# Patient Record
Sex: FEMALE | Race: WHITE | NOT HISPANIC OR LATINO | Employment: FULL TIME | URBAN - METROPOLITAN AREA
[De-identification: names, ages, dates, MRNs, and addresses within clinical notes are randomized per-mention and may not be internally consistent; named-entity substitution may affect disease eponyms.]

---

## 2024-01-19 ENCOUNTER — HOSPITAL ENCOUNTER (OUTPATIENT)
Dept: ULTRASOUND IMAGING | Facility: HOSPITAL | Age: 34
Discharge: HOME | End: 2024-01-19
Payer: COMMERCIAL

## 2024-01-19 DIAGNOSIS — N97.9 FEMALE INFERTILITY, UNSPECIFIED: ICD-10-CM

## 2024-01-19 PROCEDURE — 76830 TRANSVAGINAL US NON-OB: CPT

## 2024-01-19 PROCEDURE — 76856 US EXAM PELVIC COMPLETE: CPT

## 2024-04-09 ENCOUNTER — OFFICE VISIT (OUTPATIENT)
Dept: OBGYN CLINIC | Facility: CLINIC | Age: 34
End: 2024-04-09
Payer: COMMERCIAL

## 2024-04-09 VITALS
SYSTOLIC BLOOD PRESSURE: 98 MMHG | WEIGHT: 144.8 LBS | DIASTOLIC BLOOD PRESSURE: 62 MMHG | BODY MASS INDEX: 23.27 KG/M2 | HEIGHT: 66 IN

## 2024-04-09 DIAGNOSIS — N96 HISTORY OF MULTIPLE MISCARRIAGES: Primary | ICD-10-CM

## 2024-04-09 PROCEDURE — 99203 OFFICE O/P NEW LOW 30 MIN: CPT | Performed by: OBSTETRICS & GYNECOLOGY

## 2024-04-09 RX ORDER — LORAZEPAM 0.5 MG/1
TABLET ORAL
COMMUNITY

## 2024-04-09 RX ORDER — POLYETHYLENE GLYCOL 3350 17 G/17G
POWDER, FOR SOLUTION ORAL
COMMUNITY

## 2024-04-09 NOTE — PROGRESS NOTES
Assessment/Plan:   Diagnoses and all orders for this visit:    History of multiple miscarriages  - working with [facility]. Discussed anticipated discharge from [facility] provider to us after approximately 8-10 weeks gestation.   - discussed possible antiphospholipid antibody syndrome and what implications that may mean for pregnancy. We discussed if confirmed, anticoagulation is recommended, usually with lovenox injections during the pregnancy and to continue 6 weeks postpartum.   - semen analysis was performed recently for her . She is hoping that the records will be sent to the [place] office promptly.     Follow up for prenatal care or for annual gyn exam when due.     Other orders  -     LORazepam (ATIVAN) 0.5 mg tablet; as needed  -     polyethylene glycol (MiraLax Mix-In [place]) 17 g packet  -     Prenatal MV-Min-Fe Fum-FA-DHA (PRENATAL 1 PO); Take by mouth    I have spent a total time of 35 minutes on 24 in caring for this patient including Diagnostic results, Instructions for management, Patient and family education, Impressions, Counseling / Coordination of care, Reviewing / ordering tests, medicine, procedures  , Obtaining or reviewing history  , and Communicating with other healthcare professionals .            Subjective:    Patient ID: Camilla Cuevas is a 33 y.o. female.  Chief Complaint   Patient presents with    Consult     Pt is here for a fertility consult. Pt is seeing a specialist in [place] for fertility. Pt has had a recent miscarriage and would like to establish care with office.        Camilla is a 32yo  presenting to the office to establish care and discuss her obstetric history. Camilla has a history of 2 prior miscarriages occurring in  and . She recently had an initial consultation with an [facility] provider in [place] for evaluation of recurrent pregnancy loss and infertility. She had an HSG performed in 2024 that showed bilateral tubal patency. Her most recent blood work  "was notable for possible antiphospholipid antibody syndrome. She is due for repeat labs to be done in 6 weeks to confirm diagnosis.     As she is establishing care with an [facility], she is looking to establish care with a gyn in preparation for a prenatal care and for gyn care to follow.   Her most recent pap smear was in 2022 and showed normal cytology and neg HPV.        The following portions of the patient's history were reviewed and updated as appropriate: allergies, current medications, past family history, past medical history, past social history, past surgical history, and problem list.    Review of Systems   Constitutional:  Negative for activity change, appetite change and unexpected weight change.   Respiratory:  Negative for shortness of breath.    Cardiovascular:  Negative for chest pain.   Gastrointestinal:  Negative for constipation, diarrhea, nausea and vomiting.   Genitourinary:  Negative for dyspareunia, menstrual problem, pelvic pain, vaginal bleeding, vaginal discharge and vaginal pain.   Neurological:  Negative for weakness and headaches.         Objective:  BP 98/62 (BP Location: Left arm, Patient Position: Sitting, Cuff Size: Standard)   Ht 5' 6\" (1.676 m)   Wt 65.7 kg (144 lb 12.8 oz)   LMP 02/08/2024 (Exact Date)   BMI 23.37 kg/m²   Physical Exam  Constitutional:       General: She is not in acute distress.     Appearance: Normal appearance. She is normal weight. She is not diaphoretic.   HENT:      Head: Normocephalic and atraumatic.   Pulmonary:      Effort: Pulmonary effort is normal. No respiratory distress.   Musculoskeletal:      Right lower leg: No edema.      Left lower leg: No edema.   Neurological:      Mental Status: She is alert and oriented to person, place, and time.   Skin:     General: Skin is warm and dry.      Coloration: Skin is not pale.   Psychiatric:         Mood and Affect: Mood normal.         Behavior: Behavior normal.         Thought Content: Thought content normal. "         Judgment: Judgment normal.   Vitals and nursing note reviewed.

## 2024-08-23 ENCOUNTER — ANNUAL EXAM (OUTPATIENT)
Dept: OBGYN CLINIC | Facility: CLINIC | Age: 34
End: 2024-08-23
Payer: COMMERCIAL

## 2024-08-23 VITALS
DIASTOLIC BLOOD PRESSURE: 66 MMHG | WEIGHT: 138 LBS | BODY MASS INDEX: 22.18 KG/M2 | SYSTOLIC BLOOD PRESSURE: 100 MMHG | HEIGHT: 66 IN

## 2024-08-23 DIAGNOSIS — Z01.419 WELL WOMAN EXAM WITH ROUTINE GYNECOLOGICAL EXAM: Primary | ICD-10-CM

## 2024-08-23 DIAGNOSIS — Z11.51 SCREENING FOR HPV (HUMAN PAPILLOMAVIRUS): ICD-10-CM

## 2024-08-23 DIAGNOSIS — Z12.4 ENCOUNTER FOR SCREENING FOR MALIGNANT NEOPLASM OF CERVIX: ICD-10-CM

## 2024-08-23 PROCEDURE — 99395 PREV VISIT EST AGE 18-39: CPT | Performed by: PHYSICIAN ASSISTANT

## 2024-08-23 RX ORDER — ASPIRIN 81 MG/1
81 TABLET, CHEWABLE ORAL DAILY
COMMUNITY

## 2024-08-23 RX ORDER — LETROZOLE 2.5 MG/1
2.5 TABLET, FILM COATED ORAL DAILY
COMMUNITY
Start: 2024-08-13

## 2024-08-23 NOTE — PROGRESS NOTES
ASSESSMENT & PLAN:   Diagnoses and all orders for this visit:    Well woman exam with routine gynecological exam  -     IGP, Aptima HPV, Rfx 16/18,45    Encounter for screening for malignant neoplasm of cervix  -     IGP, Aptima HPV, Rfx 16/18,45    Screening for HPV (human papillomavirus)  -     IGP, Aptima HPV, Rfx 16/18,45    Other orders  -     aspirin 81 mg chewable tablet; Use 81 mg daily  -     letrozole (FEMARA) 2.5 mg tablet; Take 2.5 mg by mouth daily      Discussed frequency of appointments with [facility] if patient decides to pursue IVF. Currently travels 90min each direction to [facility]. Discussed [facility] new location in [place], much closer to patient home. May consider transitioning in future if traveling is too much.     The following were reviewed in today's visit: ASCCP guidelines, Gardisil vaccination, STD testing breast self exam, STD testing, exercise, and healthy diet.    Patient to return to office in yearly for annual exam.     All questions have been answered to her satisfaction.        CC:  Annual Gynecologic Examination  Chief Complaint   Patient presents with    Gynecologic Exam     Camilla Cuevas is here for her annual exam; pap ordered.         HPI: Camilla Cuevas is a 34 y.o.  who presents for annual gynecologic examination.  She has the following concerns:    PCOS - following with [facility] at [facility]. Considering IVF. Has f/u appt with [facility] next week.       Health Maintenance:    Exercise: frequently  Breast exams/breast awareness: yes    Past Medical History:   Diagnosis Date    Abnormal Pap smear of cervix     Migraine     Miscarriage     Ovarian cyst     Polycystic ovary syndrome     Possibly- waiting on confirmation from a specializt       Past Surgical History:   Procedure Laterality Date    WISDOM TOOTH EXTRACTION         Past OB/Gyn History:  Period Cycle (Days): 28  Period Duration (Days): 4  Period Pattern: Regular  Menstrual Flow: Moderate  Menstrual Control: Tampon, Panty  liner  Dysmenorrhea: (!) Moderate  Dysmenorrhea Symptoms: CrampingPatient's last menstrual period was 08/13/2024 (exact date).    Last Pap: within the last 5 years : no abnormalities  History of abnormal Pap smear: yes  HPV vaccine completed: yes    Patient is currently sexually active.   STD testing: no  Current contraception: none      Family History  Family History   Problem Relation Age of Onset    No Known Problems Mother     No Known Problems Father     No Known Problems Sister     No Known Problems Brother     Breast cancer Paternal Grandmother     Breast cancer additional onset Paternal Grandmother     Cancer Paternal Grandfather         Brain cancer       Family history of uterine or ovarian cancer: no  Family history of breast cancer: yes  Family history of colon cancer: no    Social History:  Social History     Socioeconomic History    Marital status: /Civil Union     Spouse name: Not on file    Number of children: Not on file    Years of education: Not on file    Highest education level: Not on file   Occupational History    Not on file   Tobacco Use    Smoking status: Former     Current packs/day: 0.50     Average packs/day: 0.5 packs/day for 5.0 years (2.5 ttl pk-yrs)     Types: Cigarettes    Smokeless tobacco: Never    Tobacco comments:     From 6612-2345 (occasionally)    Vaping Use    Vaping status: Never Used   Substance and Sexual Activity    Alcohol use: Yes     Alcohol/week: 2.0 standard drinks of alcohol     Types: 2 Glasses of wine per week     Comment: occ    Drug use: Never    Sexual activity: Yes     Partners: Male     Birth control/protection: None   Other Topics Concern    Not on file   Social History Narrative    Not on file     Social Determinants of Health     Financial Resource Strain: Not on file   Food Insecurity: Not on file   Transportation Needs: Not on file   Physical Activity: Not on file   Stress: Not on file   Social Connections: Not on file   Intimate Partner  "Violence: Not on file   Housing Stability: Not on file     Domestic violence screen: negative    Allergies:  No Known Allergies    Medications:    Current Outpatient Medications:     aspirin 81 mg chewable tablet, Use 81 mg daily, Disp: , Rfl:     letrozole (FEMARA) 2.5 mg tablet, Take 2.5 mg by mouth daily, Disp: , Rfl:     LORazepam (ATIVAN) 0.5 mg tablet, as needed, Disp: , Rfl:     polyethylene glycol (MiraLax Mix-In [place]) 17 g packet, , Disp: , Rfl:     Prenatal MV-Min-Fe Fum-FA-DHA (PRENATAL 1 PO), Take by mouth, Disp: , Rfl:     Review of Systems:  Review of Systems   Constitutional:  Negative for chills, fever and unexpected weight change.   Respiratory:  Negative for shortness of breath.    Cardiovascular:  Negative for chest pain.   Gastrointestinal:  Negative for abdominal pain, diarrhea, nausea and vomiting.   Skin:  Negative for rash.   Psychiatric/Behavioral:  Negative for dysphoric mood. The patient is not nervous/anxious.          Physical Exam:  /66 (BP Location: Right arm, Patient Position: Sitting, Cuff Size: Standard)   Ht 5' 6\" (1.676 m)   Wt 62.6 kg (138 lb)   LMP 08/13/2024 (Exact Date)   BMI 22.27 kg/m²    Physical Exam  Constitutional:       Appearance: Normal appearance.   Genitourinary:      Vulva and urethral meatus normal.      No lesions in the vagina.      Right Labia: No rash or lesions.     Left Labia: No lesions or rash.     No vaginal discharge, erythema or bleeding.        Right Adnexa: not tender and no mass present.     Left Adnexa: not tender and no mass present.     No cervical discharge or lesion.      Uterus is not tender.   Breasts:     Breasts are symmetrical.      Right: No mass or skin change.      Left: No mass or skin change.   HENT:      Head: Normocephalic and atraumatic.   Cardiovascular:      Rate and Rhythm: Normal rate and regular rhythm.      Heart sounds: Normal heart sounds. No murmur heard.     No friction rub. No gallop.   Pulmonary:      Effort: " Pulmonary effort is normal.      Breath sounds: Normal breath sounds. No wheezing, rhonchi or rales.   Abdominal:      General: Abdomen is flat. There is no distension.      Palpations: Abdomen is soft.      Tenderness: There is no abdominal tenderness.   Musculoskeletal:      Cervical back: Neck supple.   Lymphadenopathy:      Upper Body:      Right upper body: No axillary adenopathy.      Left upper body: No axillary adenopathy.   Neurological:      General: No focal deficit present.      Mental Status: She is alert.   Skin:     General: Skin is warm and dry.   Psychiatric:         Mood and Affect: Mood normal.         Behavior: Behavior normal.   Vitals reviewed.

## 2024-08-29 LAB
CYTOLOGIST CVX/VAG CYTO: NORMAL
DX ICD CODE: NORMAL
HPV GENOTYPE REFLEX: NORMAL
HPV I/H RISK 4 DNA CVX QL PROBE+SIG AMP: NEGATIVE
OTHER STN SPEC: NORMAL
PATH REPORT.FINAL DX SPEC: NORMAL
SL AMB NOTE:: NORMAL
SL AMB SPECIMEN ADEQUACY: NORMAL
SL AMB TEST METHODOLOGY: NORMAL

## 2024-12-18 ENCOUNTER — ULTRASOUND (OUTPATIENT)
Dept: OBGYN CLINIC | Facility: CLINIC | Age: 34
End: 2024-12-18
Payer: COMMERCIAL

## 2024-12-18 VITALS
DIASTOLIC BLOOD PRESSURE: 78 MMHG | HEIGHT: 66 IN | BODY MASS INDEX: 23.3 KG/M2 | WEIGHT: 145 LBS | SYSTOLIC BLOOD PRESSURE: 120 MMHG

## 2024-12-18 DIAGNOSIS — D68.61 ANTIPHOSPHOLIPID SYNDROME DURING PREGNANCY (HCC): ICD-10-CM

## 2024-12-18 DIAGNOSIS — O99.119 ANTIPHOSPHOLIPID SYNDROME DURING PREGNANCY (HCC): ICD-10-CM

## 2024-12-18 DIAGNOSIS — N91.2 AMENORRHEA: Primary | ICD-10-CM

## 2024-12-18 PROCEDURE — 99214 OFFICE O/P EST MOD 30 MIN: CPT | Performed by: OBSTETRICS & GYNECOLOGY

## 2024-12-18 PROCEDURE — 76817 TRANSVAGINAL US OBSTETRIC: CPT | Performed by: OBSTETRICS & GYNECOLOGY

## 2024-12-18 NOTE — PROGRESS NOTES
Assessment/Plan:  Diagnoses and all orders for this visit:    Amenorrhea  -     Ambulatory Referral to Maternal Fetal Medicine; Future  -     AMB US OB < 14 weeks single or first gestation level 1    Antiphospholipid syndrome during pregnancy (HCC)  -     Ambulatory Referral to Maternal Fetal Medicine; Future        - Viable IUP @ 9w 2d EGA  - ARISTEO 25  - Continue PNV  - Patient to call for concerns  - RTO ~ 10-12 weeks for OB intake  - call MFM for 13 week NT scan/genetic testing.           Subjective:       Patient ID: Camilla Cuevas 1990        Camilla Cuevas is a 34 y.o.  presenting to the office for pregnancy confirmation. Patient's last menstrual period was 10/14/2024. , placing her at 9w2d today with ARISTEO of 25. She is feeling well. Patient has APS and is currently on Lovenox ppx and ASA.      Nausea:no  Vomiting: no  Bleeding: occasional spotting  Cramping: no  Headaches: no  Fatigue: yes  Constipation: no  Blood type, if known: unknown       OB History    Para Term  AB Living   3    2    SAB IAB Ectopic Multiple Live Births   2          # Outcome Date GA Lbr Faraz/2nd Weight Sex Type Anes PTL Lv   3 Current            2 SAB 24 4w0d    SAB   ND   1 SAB  6w0d    SAB         Obstetric Comments   Menarche 14          The following portions of the patient's history were reviewed and updated as appropriate: allergies, current medications, past family history, past medical history, past social history, past surgical history, and problem list.    Allergies:  Patient has no known allergies.    Medications:    Current Outpatient Medications:     aspirin 81 mg chewable tablet, Use 81 mg daily, Disp: , Rfl:     LORazepam (ATIVAN) 0.5 mg tablet, as needed, Disp: , Rfl:     polyethylene glycol (MiraLax Mix-In [place]) 17 g packet, , Disp: , Rfl:     Prenatal MV-Min-Fe Fum-FA-DHA (PRENATAL 1 PO), Take by mouth, Disp: , Rfl:       Review of Systems:   Review of Systems      "  Objective:       Visit Vitals  /78 (BP Location: Right arm, Patient Position: Sitting, Cuff Size: Standard)   Ht 5' 6\" (1.676 m)   Wt 65.8 kg (145 lb)   LMP 10/14/2024   BMI 23.40 kg/m²   OB Status Pregnant   Smoking Status Former   BSA 1.74 m²        GEN: The patient was alert and oriented x3, pleasant well-appearing female in no acute distress.   CV: Regular rate  PULM: nonlabored respirations  MSK: Normal gait  : normal external female genitalia   Skin: warm, dry  Neuro: no focal deficits  Psych: normal affect and judgement, cooperative    Ultrasound:     Viability US     Gestational sac: present               Location: intrauterine  Yolk sac: present  Fetal pole: present               CRL: 2.28 cm = 9w0d  Cardiac activity: present               Rate: 167 bpm     Ovaries: normal appearing bilaterally  Cul de sac: absence of free fluid  Uterus: normal in appearance           Ultrasound Probe Disinfection    A transvaginal ultrasound was performed.   Prior to use, disinfection was performed with High Level Disinfection Process (Trophon)  Probe serial number [id number]: [id number] was used    I have spent a total time of 30 minutes in caring for this patient on the day of the visit/encounter including Instructions for management, Patient and family education, Counseling / Coordination of care, Documenting in the medical record, Reviewing / ordering tests, medicine, procedures  , and Obtaining or reviewing history  .      Beulah Harkins MD  12/18/24  10:42 AM    "

## 2024-12-19 ENCOUNTER — TELEPHONE (OUTPATIENT)
Age: 34
End: 2024-12-19

## 2024-12-26 NOTE — PATIENT INSTRUCTIONS
Congratulations!! Please review our Pregnancy Essential Guide and [facility] L&D Virtual tour from our networks website.     [facility] Pregnancy Essentials Guide  [facility] ([email or web address])     [facility] ([email or web address])       .

## 2024-12-27 ENCOUNTER — INITIAL PRENATAL (OUTPATIENT)
Dept: OBGYN CLINIC | Facility: CLINIC | Age: 34
End: 2024-12-27

## 2024-12-27 VITALS
BODY MASS INDEX: 23.14 KG/M2 | DIASTOLIC BLOOD PRESSURE: 60 MMHG | HEIGHT: 66 IN | SYSTOLIC BLOOD PRESSURE: 108 MMHG | WEIGHT: 144 LBS

## 2024-12-27 DIAGNOSIS — O99.119 ANTIPHOSPHOLIPID SYNDROME DURING PREGNANCY (HCC): ICD-10-CM

## 2024-12-27 DIAGNOSIS — Z31.430 ENCOUNTER OF FEMALE FOR TESTING FOR GENETIC DISEASE CARRIER STATUS FOR PROCREATIVE MANAGEMENT: ICD-10-CM

## 2024-12-27 DIAGNOSIS — Z34.81 PRENATAL CARE, SUBSEQUENT PREGNANCY, FIRST TRIMESTER: Primary | ICD-10-CM

## 2024-12-27 DIAGNOSIS — Z36.9 ENCOUNTER FOR ANTENATAL SCREENING: ICD-10-CM

## 2024-12-27 DIAGNOSIS — E28.2 PCOS (POLYCYSTIC OVARIAN SYNDROME): ICD-10-CM

## 2024-12-27 DIAGNOSIS — D68.61 ANTIPHOSPHOLIPID SYNDROME DURING PREGNANCY (HCC): ICD-10-CM

## 2024-12-27 PROCEDURE — OBC

## 2024-12-27 RX ORDER — ENOXAPARIN SODIUM 100 MG/ML
40 INJECTION SUBCUTANEOUS DAILY
COMMUNITY
Start: 2024-11-08 | End: 2024-12-27 | Stop reason: SDUPTHER

## 2024-12-27 RX ORDER — ENOXAPARIN SODIUM 100 MG/ML
40 INJECTION SUBCUTANEOUS DAILY
Qty: 36 ML | Refills: 3 | Status: SHIPPED | OUTPATIENT
Start: 2024-12-27 | End: 2025-03-27

## 2024-12-27 NOTE — PROGRESS NOTES
OB INTAKE INTERVIEW  Patient is 34 y.o. who presents for OB intake at 10 4/7wks  She is accompanied by herself during this encounter  The father of her baby Darwin Cuevas is involved in the pregnancy and is 43 years old.      Last Menstrual Period: 10/14/24  Ultrasound: Measured 9 weeks 0 days on 24  Estimated Date of Delivery: 25 confirmed by 9 week US    Signs/Symptoms of Pregnancy  Current pregnancy symptoms: Nausea, fatigue  Constipation YES  Headaches no  Cramping/spotting no  PICA cravings no    Diabetes-  Body mass index is 23.24 kg/m².  If patient has 1 or more, please order early 1 hour GTT  History of GDM no  BMI >35 no  History of PCOS or current metformin use YES-PCOS  History of LGA/macrosomic infant (4000g/9lbs) no    If patient has 2 or more, please order early 1 hour GTT  BMI>30 no  AMA no  First degree relative with type 2 diabetes no  History of chronic HTN, hyperlipidemia, elevated A1C no  High risk race (, , ,  or ) no    Hypertension- if you answer yes to any of the following, please order baseline preeclampsia labs (cbc, comprehensive metabolic panel, urine protein creatinine ratio, uric acid)  History of of chronic HTN no  History of gestational HTN no  History of preeclampsia, eclampsia, or HELLP syndrome no  History of diabetes no  History of lupus,sjogrens syndrome, kidney disease no    Thyroid- if yes order TSH with reflex T4  History of thyroid disease no    Bleeding Disorder or Hx of DVT-patient or first degree relative with history of. Order the following if not done previously.   (Factor V, antithrombin III, prothrombin gene mutation, protein C and S Ag, lupus anticoagulant, anticardiolipin, beta-2 glycoprotein)   YES-Patient has Antiphospholipid Syndrome-on lovenox.    OB/GYN-  History of abnormal pap smear YES       Date of last pap smear 10/14/24  History of HPV YES  History of Herpes/HSV no  History of  other STI (gonorrhea, chlamydia, trich) no  History of prior  no  History of prior  no  History of  delivery prior to 36 weeks 6 days no  History of Varicella or Vaccination had varicella  History of blood transfusion no  Ok for blood transfusion YES    Substance screening-   History of tobacco use YES-stopped 10 years ago  Currently using tobacco no  Substance Use Screen Level (N/A, LOW, HIGH) N/A    MRSA Screening-   Does the pt have a hx of MRSA? no    Immunizations:  Influenza vaccine given this season Not interested  Discussed Tdap vaccine YES  Discussed COVID Vaccine YES    Genetic/[facility]-  Do you or your partner have a history of any of the following in yourselves or first degree relatives?  Cystic fibrosis no  Spinal muscular atrophy no  Hemoglobinopathy/Sickle Cell/Thalassemia no  Fragile X Intellectual Disability no      If no, discuss Carrier Screening being completed once in a lifetime as a standard of care lab test. Place orders for Cystic Fibrosis Gene Test ([id number]) and Spinal Muscular Atrophy DNA ([id number]) Patient had screenings done in  and was not a carrier. Done in [facility] [place].      Appointment for Nuchal Translucency Ultrasound at [facility] scheduled for 25      Interview education  [facility] Pregnancy Essentials Book reviewed, discussed and attached to their AVS yes    Nurse/Family Partnership- patient may qualify No; referral placed No    Prenatal lab work scripts YES  Extra labs ordered:  1 hr GTT    Aspirin/Preeclampsia Screen    Risk Level Risk Factor Recommendation   LOW Prior Uncomplicated full-term delivery no No Aspirin recommendation        MODERATE Nulliparity YES Recommend low-dose aspirin if     BMI>30 no 2 or more moderate risk factors    Family History Preeclampsia (mother/sister) no     35yr old or greater no     Black Race, Concern for SDOH/Low Socioeconomic no     IVF Pregnancy  no     Personal History Risks (low birth weight, prior adverse preg  outcome, >10yr preg interval) no         HIGH History of Preeclampsia no Recommend low-dose aspirin if     Multifetal gestation no 1 or more high risk factors    Chronic HTN no     Type 1 or 2 Diabetes no     Renal Disease no     Autoimmune Disease  no      Contraindications to ASA therapy:  NSAID/ ASA allergy: no  Nasal polyps: no  Asthma with history of ASA induced bronchospasm: no  Relative contraindications:  History of GI bleed: no  Active peptic ulcer disease: no  Severe hepatic dysfunction: no    Patient should be recommended to take ASA 162mg during this pregnancy from 12-36wks to lower her risk of preeclampsia: Patient has already started ASA therapy per [facility].          The patient has a history now or in prior pregnancy notable for:  Antiphospholipid Syndrome- currently on Lovenox daily, PCOS-not on metformin, Migraines, 2 miscarriages, Anxiety-EPDS-2,       Details that I feel the provider should be aware of: This is a planned and welcomed pregnancy for parents. Patient was being seen at [facility] however now with spontaneous conception. Patient is experiencing some nausea. OTC medications and diet were discussed. 1hr GTT ordered for PCOS, Patient has Antiphospholipid Syndrome and is currently self administering Lovenox 40 mg daily. Pa- Kevin will refill Patient lovenox. Patient was on ativan for anxiety however stopped with confirmed pregnancy.  Patient knows to call OB for symptoms and how to contact her nurse navigator. Patient was made aware to have lab orders completed before next appointment. Patient denies any questions at this point and verbalizes understanding.         PN1 visit scheduled. The patient was oriented to our practice, the navigator role, reviewed delivering physicians and [facility] for Delivery. All questions were answered.    Interviewed by: Jolynn Castro RN

## 2024-12-31 DIAGNOSIS — D68.61 ANTIPHOSPHOLIPID SYNDROME DURING PREGNANCY (HCC): ICD-10-CM

## 2024-12-31 DIAGNOSIS — O99.119 ANTIPHOSPHOLIPID SYNDROME DURING PREGNANCY (HCC): ICD-10-CM

## 2024-12-31 RX ORDER — ENOXAPARIN SODIUM 100 MG/ML
40 INJECTION SUBCUTANEOUS DAILY
Qty: 36 ML | Refills: 3 | Status: SHIPPED | OUTPATIENT
Start: 2024-12-31 | End: 2025-03-31

## 2025-01-03 ENCOUNTER — APPOINTMENT (OUTPATIENT)
Dept: LAB | Facility: CLINIC | Age: 35
End: 2025-01-03
Payer: COMMERCIAL

## 2025-01-03 ENCOUNTER — RESULTS FOLLOW-UP (OUTPATIENT)
Dept: OBGYN CLINIC | Facility: CLINIC | Age: 35
End: 2025-01-03

## 2025-01-03 DIAGNOSIS — Z34.81 PRENATAL CARE, SUBSEQUENT PREGNANCY, FIRST TRIMESTER: ICD-10-CM

## 2025-01-03 DIAGNOSIS — E28.2 PCOS (POLYCYSTIC OVARIAN SYNDROME): ICD-10-CM

## 2025-01-03 LAB
ABO GROUP BLD: NORMAL
BACTERIA UR QL AUTO: NORMAL /HPF
BASOPHILS # BLD AUTO: 0.02 THOUSANDS/ΜL (ref 0–0.1)
BASOPHILS NFR BLD AUTO: 0 % (ref 0–1)
BILIRUB UR QL STRIP: NEGATIVE
BLD GP AB SCN SERPL QL: NEGATIVE
CLARITY UR: CLEAR
COLOR UR: NORMAL
EOSINOPHIL # BLD AUTO: 0.04 THOUSAND/ΜL (ref 0–0.61)
EOSINOPHIL NFR BLD AUTO: 1 % (ref 0–6)
ERYTHROCYTE [DISTWIDTH] IN BLOOD BY AUTOMATED COUNT: 12.4 % (ref 11.6–15.1)
GLUCOSE 1H P 50 G GLC PO SERPL-MCNC: 108 MG/DL (ref 70–134)
GLUCOSE UR STRIP-MCNC: NEGATIVE MG/DL
HBV SURFACE AG SER QL: NORMAL
HCT VFR BLD AUTO: 37.7 % (ref 34.8–46.1)
HCV AB SER QL: NORMAL
HGB BLD-MCNC: 13 G/DL (ref 11.5–15.4)
HGB UR QL STRIP.AUTO: NEGATIVE
HIV 1+2 AB+HIV1 P24 AG SERPL QL IA: NORMAL
HIV 2 AB SERPL QL IA: NORMAL
HIV1 AB SERPL QL IA: NORMAL
HIV1 P24 AG SERPL QL IA: NORMAL
IMM GRANULOCYTES # BLD AUTO: 0.06 THOUSAND/UL (ref 0–0.2)
IMM GRANULOCYTES NFR BLD AUTO: 1 % (ref 0–2)
KETONES UR STRIP-MCNC: NEGATIVE MG/DL
LEUKOCYTE ESTERASE UR QL STRIP: NEGATIVE
LYMPHOCYTES # BLD AUTO: 1.71 THOUSANDS/ΜL (ref 0.6–4.47)
LYMPHOCYTES NFR BLD AUTO: 23 % (ref 14–44)
MCH RBC QN AUTO: 31.3 PG (ref 26.8–34.3)
MCHC RBC AUTO-ENTMCNC: 34.5 G/DL (ref 31.4–37.4)
MCV RBC AUTO: 91 FL (ref 82–98)
MONOCYTES # BLD AUTO: 0.41 THOUSAND/ΜL (ref 0.17–1.22)
MONOCYTES NFR BLD AUTO: 6 % (ref 4–12)
NEUTROPHILS # BLD AUTO: 5.28 THOUSANDS/ΜL (ref 1.85–7.62)
NEUTS SEG NFR BLD AUTO: 69 % (ref 43–75)
NITRITE UR QL STRIP: NEGATIVE
NON-SQ EPI CELLS URNS QL MICRO: NORMAL /HPF
NRBC BLD AUTO-RTO: 0 /100 WBCS
PH UR STRIP.AUTO: 6.5 [PH]
PLATELET # BLD AUTO: 334 THOUSANDS/UL (ref 149–390)
PMV BLD AUTO: 8.8 FL (ref 8.9–12.7)
PROT UR STRIP-MCNC: NEGATIVE MG/DL
RBC # BLD AUTO: 4.15 MILLION/UL (ref 3.81–5.12)
RBC #/AREA URNS AUTO: NORMAL /HPF
RH BLD: POSITIVE
RUBV IGG SERPL IA-ACNC: 18.1 IU/ML
SP GR UR STRIP.AUTO: 1.02 (ref 1–1.03)
TREPONEMA PALLIDUM IGG+IGM AB [PRESENCE] IN SERUM OR PLASMA BY IMMUNOASSAY: NORMAL
UROBILINOGEN UR STRIP-ACNC: <2 MG/DL
WBC # BLD AUTO: 7.52 THOUSAND/UL (ref 4.31–10.16)
WBC #/AREA URNS AUTO: NORMAL /HPF

## 2025-01-03 PROCEDURE — 86900 BLOOD TYPING SEROLOGIC ABO: CPT

## 2025-01-03 PROCEDURE — 85025 COMPLETE CBC W/AUTO DIFF WBC: CPT

## 2025-01-03 PROCEDURE — 87086 URINE CULTURE/COLONY COUNT: CPT

## 2025-01-03 PROCEDURE — 87340 HEPATITIS B SURFACE AG IA: CPT

## 2025-01-03 PROCEDURE — 86901 BLOOD TYPING SEROLOGIC RH(D): CPT

## 2025-01-03 PROCEDURE — 86803 HEPATITIS C AB TEST: CPT

## 2025-01-03 PROCEDURE — 82950 GLUCOSE TEST: CPT

## 2025-01-03 PROCEDURE — 87389 HIV-1 AG W/HIV-1&-2 AB AG IA: CPT

## 2025-01-03 PROCEDURE — 86780 TREPONEMA PALLIDUM: CPT

## 2025-01-03 PROCEDURE — 86850 RBC ANTIBODY SCREEN: CPT

## 2025-01-03 PROCEDURE — 81001 URINALYSIS AUTO W/SCOPE: CPT

## 2025-01-03 PROCEDURE — 86762 RUBELLA ANTIBODY: CPT

## 2025-01-03 PROCEDURE — 36415 COLL VENOUS BLD VENIPUNCTURE: CPT

## 2025-01-04 LAB — BACTERIA UR CULT: NORMAL

## 2025-01-14 ENCOUNTER — ROUTINE PRENATAL (OUTPATIENT)
Facility: HOSPITAL | Age: 35
End: 2025-01-14
Attending: OBSTETRICS & GYNECOLOGY
Payer: COMMERCIAL

## 2025-01-14 VITALS
SYSTOLIC BLOOD PRESSURE: 110 MMHG | WEIGHT: 145.94 LBS | DIASTOLIC BLOOD PRESSURE: 68 MMHG | HEIGHT: 66 IN | HEART RATE: 69 BPM | BODY MASS INDEX: 23.46 KG/M2

## 2025-01-14 DIAGNOSIS — D68.61 ANTIPHOSPHOLIPID SYNDROME DURING PREGNANCY (HCC): ICD-10-CM

## 2025-01-14 DIAGNOSIS — O99.119 ANTIPHOSPHOLIPID SYNDROME DURING PREGNANCY (HCC): ICD-10-CM

## 2025-01-14 DIAGNOSIS — N91.2 AMENORRHEA: ICD-10-CM

## 2025-01-14 DIAGNOSIS — Z3A.13 13 WEEKS GESTATION OF PREGNANCY: Primary | ICD-10-CM

## 2025-01-14 DIAGNOSIS — Z36.9 ENCOUNTER FOR ANTENATAL SCREENING, UNSPECIFIED: ICD-10-CM

## 2025-01-14 PROCEDURE — 76813 OB US NUCHAL MEAS 1 GEST: CPT | Performed by: OBSTETRICS & GYNECOLOGY

## 2025-01-14 PROCEDURE — 36415 COLL VENOUS BLD VENIPUNCTURE: CPT | Performed by: OBSTETRICS & GYNECOLOGY

## 2025-01-14 PROCEDURE — 99203 OFFICE O/P NEW LOW 30 MIN: CPT | Performed by: OBSTETRICS & GYNECOLOGY

## 2025-01-14 RX ORDER — OMEGA-3S/DHA/EPA/FISH OIL/D3 300MG-1000
400 CAPSULE ORAL DAILY
COMMUNITY

## 2025-01-14 NOTE — LETTER
"   Date: 2025    Beulah Harkins MD  [address]  [address]  [facility] [address]    Patient: Camilla Cuevas   YOB: 1990   Date of Visit: 2025   Gestational age 13w1d   Nature of this communication: Routine though please note she has questions about work (continuing to work remotely) and has paperwork for this.       This patient was seen recently in our  office.  Please see ultrasound report under \"OB Procedures\" tab.  Please don't hesitate to contact our office with any concerns or questions.      Sincerely,      Ana Luisa Ayoub MD  Attending Physician, Maternal-Fetal Medicine  [facility]      "

## 2025-01-14 NOTE — PROGRESS NOTES
Patient chose to have [facility] [id number] Non-Invasive Prenatal Screen [id number] PLUS w/ SCA, WITH fetal sex.  Patient choose to be billed through insurance.     Patient given brochure and is aware [facility] will contact patient's insurance and coordinate coverage.  Provided [facility] contact information. General inquiries [phone], Cost estimates [phone] and [facility] Billing [phone]. Website [email or web address].     Blood collection tubes labeled with patient identifiers (name, medical record number, and date of birth).     Filled out [facility] order form. Patient chose to have blood drawn in our office at time of visit. NIPS was drawn from left arm with a butterfly needle by Celestina. .      If patient chose to have blood work drawn at a [facility] lab we requested patient notify MFM (via phone call or [employer] message) when blood collected so office can follow up on results.       Maternal Fetal Medicine will have results in approximately 5-7 business days and will call patient or notify via [employer].  Patient aware viewing lab result online will reveal fetal sex if ordered.    Patient verbalized understanding of all instructions and no questions at this time.

## 2025-01-14 NOTE — PROGRESS NOTES
"[facility]: Ms. Cuevas was seen today for nuchal translucency ultrasound.  See ultrasound report under \"OB Procedures\" tab.      MDM:   I. Diagnoses/Problems addressed:  Stable chronic illness: APS  II.  Data: I ordered the following tests: NIPS.  III.  Risk of morbidity: Moderate    Please don't hesitate to contact our office with any concerns or questions.  -Ana Luisa Ayoub MD      "

## 2025-01-19 ENCOUNTER — RESULTS FOLLOW-UP (OUTPATIENT)
Facility: HOSPITAL | Age: 35
End: 2025-01-19

## 2025-01-19 LAB
CFDNA.FET/CFDNA.TOTAL SFR FETUS: NORMAL %
CITATION REF LAB TEST: NORMAL
FET 13+18+21+X+Y ANEUP PLAS.CFDNA: NEGATIVE
FET CHR 21 TS PLAS.CFDNA QL: NEGATIVE
FET CHR 21 TS PLAS.CFDNA QL: NEGATIVE
FET MS X RISK WBC.DNA+CFDNA QL: NOT DETECTED
FET SEX PLAS.CFDNA DOSAGE CFDNA: NORMAL
FET TS 13 RISK PLAS.CFDNA QL: NEGATIVE
FET X + Y ANEUP RISK PLAS.CFDNA SEQ-IMP: NOT DETECTED
GA EST FROM CONCEPTION DATE: NORMAL D
GESTATIONAL AGE > 9:: YES
LAB DIRECTOR NAME PROVIDER: NORMAL
LAB DIRECTOR NAME PROVIDER: NORMAL
LABORATORY COMMENT REPORT: NORMAL
LIMITATIONS OF THE TEST: NORMAL
NEGATIVE PREDICTIVE VALUE: NORMAL
PERFORMANCE CHARACTERISTICS: NORMAL
POSITIVE PREDICTIVE VALUE: NORMAL
REF LAB TEST METHOD: NORMAL
SL AMB NOTE:: NORMAL
TEST PERFORMANCE INFO SPEC: NORMAL

## 2025-01-24 ENCOUNTER — INITIAL PRENATAL (OUTPATIENT)
Dept: OBGYN CLINIC | Facility: CLINIC | Age: 35
End: 2025-01-24

## 2025-01-24 VITALS — WEIGHT: 151.4 LBS | BODY MASS INDEX: 24.44 KG/M2 | DIASTOLIC BLOOD PRESSURE: 66 MMHG | SYSTOLIC BLOOD PRESSURE: 102 MMHG

## 2025-01-24 DIAGNOSIS — D68.61 ANTIPHOSPHOLIPID SYNDROME DURING PREGNANCY (HCC): Primary | ICD-10-CM

## 2025-01-24 DIAGNOSIS — Z3A.14 14 WEEKS GESTATION OF PREGNANCY: ICD-10-CM

## 2025-01-24 DIAGNOSIS — O99.119 ANTIPHOSPHOLIPID SYNDROME DURING PREGNANCY (HCC): Primary | ICD-10-CM

## 2025-01-24 PROCEDURE — 87491 CHLMYD TRACH DNA AMP PROBE: CPT | Performed by: OBSTETRICS & GYNECOLOGY

## 2025-01-24 PROCEDURE — PNV: Performed by: OBSTETRICS & GYNECOLOGY

## 2025-01-24 PROCEDURE — 87591 N.GONORRHOEAE DNA AMP PROB: CPT | Performed by: OBSTETRICS & GYNECOLOGY

## 2025-01-24 PROCEDURE — G0143 SCR C/V CYTO,THINLAYER,RESCR: HCPCS | Performed by: OBSTETRICS & GYNECOLOGY

## 2025-01-24 PROCEDURE — G0476 HPV COMBO ASSAY CA SCREEN: HCPCS | Performed by: OBSTETRICS & GYNECOLOGY

## 2025-01-24 NOTE — PROGRESS NOTES
34 y.o.  female at 14w4d (Estimated Date of Delivery: 25) for PNV.    Cramping: no  Bleeding: no  LOF: no  NT/13 week scan scheduled: yes  AFP ordered if indicated: yes  Prenatal labs complete (including Heb B, HIV): yes; date completed /3  Flu vaccine up to date: no  Covid vaccine up to date: no  Urine neg.neg    Obstetric History  2 previous miscarriages   APS- on lovenox and ASA     GYN History   Last pap- one abnormal pap in her 20s   STI history- none     Medical History: PCOS   Surgical History: D&C in may, colonoscopy     Pre- Vitals      Flowsheet Row Most Recent Value   Prenatal Assessment    Fetal Heart Rate 150   Movement Absent   Prenatal Vitals    Blood Pressure 102/66   Weight - Scale 68.7 kg (151 lb 6.4 oz)   Urine Albumin/Glucose    Dilation/Effacement/Station    Vaginal Drainage    Edema           TW.078 kg (13 lb 6.4 oz)    Physical Exam  Constitutional:       General: She is not in acute distress.     Appearance: Normal appearance. She is not ill-appearing or toxic-appearing.   Genitourinary:      Genitourinary Comments: Normal external female genitalia   No lesions or skin changes noted on the vulva, perineum or perianal region   On speculum exam, there vaginal mucosa is well estrogenized.No abnormal discharge, lesions or bleeding   Cervix is visualized and grossly normal in appearance. No bleeding or abnormal discharge noted. Pap and GC collected   On bimanual exam, no CMT. The uterus is anteverted, 14w size and contour.   There are no adnexal masses or palpable fullness.      HENT:      Head: Normocephalic and atraumatic.   Pulmonary:      Effort: Pulmonary effort is normal.   Abdominal:      Palpations: Abdomen is soft.      Tenderness: There is no abdominal tenderness. There is no guarding or rebound.   Musculoskeletal:         General: Normal range of motion.      Cervical back: Normal range of motion.   Neurological:      General: No focal deficit present.      Mental  Status: She is alert. Mental status is at baseline.   Skin:     General: Skin is warm and dry.   Psychiatric:         Mood and Affect: Mood normal.         Behavior: Behavior normal.         Thought Content: Thought content normal.         Judgment: Judgment normal.         Pap collected: yes  GC collected:yes  Pelvis feels adequate for IZAIAH: yes  Plans to breastfeed: yes  Weight gain discussed. Exercise encouraged.   Oriented to practice/delivery location.       RTO in 4 weeks.

## 2025-01-27 LAB
HPV HR 12 DNA CVX QL NAA+PROBE: NEGATIVE
HPV16 DNA CVX QL NAA+PROBE: NEGATIVE
HPV18 DNA CVX QL NAA+PROBE: NEGATIVE

## 2025-01-28 LAB
C TRACH DNA SPEC QL NAA+PROBE: NEGATIVE
N GONORRHOEA DNA SPEC QL NAA+PROBE: NEGATIVE

## 2025-01-29 ENCOUNTER — RESULTS FOLLOW-UP (OUTPATIENT)
Dept: OBGYN CLINIC | Facility: CLINIC | Age: 35
End: 2025-01-29

## 2025-01-29 LAB
LAB AP GYN PRIMARY INTERPRETATION: NORMAL
LAB AP LMP: NORMAL
Lab: NORMAL

## 2025-02-13 ENCOUNTER — TELEPHONE (OUTPATIENT)
Dept: OBGYN CLINIC | Facility: CLINIC | Age: 35
End: 2025-02-13

## 2025-02-13 NOTE — TELEPHONE ENCOUNTER
Patient was called for 2 trimester follow up.  Left a voicemail to call the office with any questions, concerns or symptoms. Patient was reminded that she can message me via portal as well.

## 2025-02-20 ENCOUNTER — TELEPHONE (OUTPATIENT)
Dept: OBGYN CLINIC | Facility: CLINIC | Age: 35
End: 2025-02-20

## 2025-02-21 ENCOUNTER — ROUTINE PRENATAL (OUTPATIENT)
Dept: OBGYN CLINIC | Facility: CLINIC | Age: 35
End: 2025-02-21

## 2025-02-21 VITALS
BODY MASS INDEX: 23.98 KG/M2 | WEIGHT: 149.2 LBS | HEIGHT: 66 IN | DIASTOLIC BLOOD PRESSURE: 62 MMHG | SYSTOLIC BLOOD PRESSURE: 104 MMHG

## 2025-02-21 DIAGNOSIS — D68.61 ANTIPHOSPHOLIPID SYNDROME DURING PREGNANCY (HCC): Primary | ICD-10-CM

## 2025-02-21 DIAGNOSIS — O99.119 ANTIPHOSPHOLIPID SYNDROME DURING PREGNANCY (HCC): Primary | ICD-10-CM

## 2025-02-21 DIAGNOSIS — Z3A.19 19 WEEKS GESTATION OF PREGNANCY: ICD-10-CM

## 2025-02-21 PROCEDURE — PNV

## 2025-02-21 NOTE — PROGRESS NOTES
"Patient is a 33 YO  female presenting to the office at 18w4d for routine OB care.   Patient c/o \"three gushes of fluid\" last week shortly after intercourse. This has not continued. Vaginal examination done today with absent pooling, cervix is closed. Patient encouraged to continue monitoring at home. We discussed warning signs and I encouraged her to call the office/go to L&D if she experiences fluid leakage.   Antiphospholipid syndrome on Lovenox 40mg QD  Fetal heart rate: 140  BP: 104/62  TWlb 3.2oz  Fetal Movement: yes, flutters  LOF: see above  VB: no  CTX: no  Reviewed precautions  Call for concerns  RTO 4 weeks     "

## 2025-03-04 NOTE — PROGRESS NOTES
Please refer to the [facility] ultrasound report in Ob Procedures for additional information regarding today's visit

## 2025-03-06 ENCOUNTER — ROUTINE PRENATAL (OUTPATIENT)
Facility: HOSPITAL | Age: 35
End: 2025-03-06
Payer: COMMERCIAL

## 2025-03-06 VITALS
HEART RATE: 71 BPM | DIASTOLIC BLOOD PRESSURE: 58 MMHG | HEIGHT: 66 IN | SYSTOLIC BLOOD PRESSURE: 110 MMHG | BODY MASS INDEX: 24.33 KG/M2 | WEIGHT: 151.4 LBS

## 2025-03-06 DIAGNOSIS — Z36.86 ENCOUNTER FOR ANTENATAL SCREENING FOR CERVICAL LENGTH: ICD-10-CM

## 2025-03-06 DIAGNOSIS — O99.119 ANTIPHOSPHOLIPID SYNDROME DURING PREGNANCY (HCC): ICD-10-CM

## 2025-03-06 DIAGNOSIS — Z3A.20 20 WEEKS GESTATION OF PREGNANCY: Primary | ICD-10-CM

## 2025-03-06 DIAGNOSIS — Z36.3 ENCOUNTER FOR ANTENATAL SCREENING FOR MALFORMATIONS: ICD-10-CM

## 2025-03-06 DIAGNOSIS — D68.61 ANTIPHOSPHOLIPID SYNDROME DURING PREGNANCY (HCC): ICD-10-CM

## 2025-03-06 PROCEDURE — 76817 TRANSVAGINAL US OBSTETRIC: CPT | Performed by: OBSTETRICS & GYNECOLOGY

## 2025-03-06 PROCEDURE — 76805 OB US >/= 14 WKS SNGL FETUS: CPT | Performed by: OBSTETRICS & GYNECOLOGY

## 2025-03-06 PROCEDURE — 99213 OFFICE O/P EST LOW 20 MIN: CPT | Performed by: OBSTETRICS & GYNECOLOGY

## 2025-03-06 NOTE — LETTER
March 6, 2025     Beulah Harkins MD  [address]  [address]  [place] [address]    Patient: Camilla Cuevas   YOB: 1990   Date of Visit: 3/6/2025       Dear Dr. Harkins:    Thank you for referring Camilla Cuevas to me for evaluation. Below are my notes for this consultation.    If you have questions, please do not hesitate to call me. I look forward to following your patient along with you.         Sincerely,        Jasiel Roberts MD        CC: No Recipients    Jasiel Roberts MD  3/6/2025  8:15 AM  Sign when Signing Visit  Please refer to the [facility] ultrasound report in Ob Procedures for additional information regarding today's visit

## 2025-03-06 NOTE — PROGRESS NOTES
Ultrasound Probe Disinfection    A transvaginal ultrasound was performed.   Prior to use, disinfection was performed with High Level Disinfection Process (inthincon).  Probe serial number A 3:LOANER [id number] was used.    Jo Mcintyre  03/06/25  7:55 AM

## 2025-03-10 ENCOUNTER — CLINICAL SUPPORT (OUTPATIENT)
Dept: POSTPARTUM | Facility: CLINIC | Age: 35
End: 2025-03-10

## 2025-03-10 DIAGNOSIS — Z32.2 ENCOUNTER FOR CHILDBIRTH INSTRUCTION: Primary | ICD-10-CM

## 2025-03-21 ENCOUNTER — ROUTINE PRENATAL (OUTPATIENT)
Dept: OBGYN CLINIC | Facility: CLINIC | Age: 35
End: 2025-03-21

## 2025-03-21 VITALS — DIASTOLIC BLOOD PRESSURE: 64 MMHG | WEIGHT: 156.2 LBS | BODY MASS INDEX: 25.21 KG/M2 | SYSTOLIC BLOOD PRESSURE: 98 MMHG

## 2025-03-21 DIAGNOSIS — Z3A.22 22 WEEKS GESTATION OF PREGNANCY: ICD-10-CM

## 2025-03-21 DIAGNOSIS — D68.61 ANTIPHOSPHOLIPID SYNDROME DURING PREGNANCY (HCC): Primary | ICD-10-CM

## 2025-03-21 DIAGNOSIS — O99.119 ANTIPHOSPHOLIPID SYNDROME DURING PREGNANCY (HCC): Primary | ICD-10-CM

## 2025-03-21 PROCEDURE — PNV: Performed by: OBSTETRICS & GYNECOLOGY

## 2025-03-21 RX ORDER — ENOXAPARIN SODIUM 100 MG/ML
40 INJECTION SUBCUTANEOUS DAILY
Qty: 36 ML | Refills: 3 | Status: SHIPPED | OUTPATIENT
Start: 2025-03-21 | End: 2025-06-19

## 2025-03-21 NOTE — PROGRESS NOTES
34 y.o.  female at 22w4d (Estimated Date of Delivery: 25) for PNV.    Pre-Perla Vitals      Flowsheet Row Most Recent Value   Prenatal Assessment    Fetal Heart Rate 135   Movement Present   Prenatal Vitals    Blood Pressure 98/64   Weight - Scale 70.9 kg (156 lb 3.2 oz)   Urine Albumin/Glucose    Dilation/Effacement/Station    Vaginal Drainage    Edema           TW.255 kg (18 lb 3.2 oz)    Cramping/contractions: no  Bleeding: no  LOF: no  FM: yes  28 wk labs ordered: yes  TSH indicated & ordered: no  RH negative - type & screen ordered: no  Prenatal labs complete (including Heb B, HIV): yes : if NO, add to labs today.    APLS: Lovenox refilled      RTO in 4 weeks.

## 2025-04-18 ENCOUNTER — ROUTINE PRENATAL (OUTPATIENT)
Dept: OBGYN CLINIC | Facility: CLINIC | Age: 35
End: 2025-04-18

## 2025-04-18 VITALS — SYSTOLIC BLOOD PRESSURE: 114 MMHG | BODY MASS INDEX: 25.5 KG/M2 | WEIGHT: 158 LBS | DIASTOLIC BLOOD PRESSURE: 70 MMHG

## 2025-04-18 DIAGNOSIS — D68.61 ANTIPHOSPHOLIPID SYNDROME DURING PREGNANCY (HCC): Primary | ICD-10-CM

## 2025-04-18 DIAGNOSIS — O99.119 ANTIPHOSPHOLIPID SYNDROME DURING PREGNANCY (HCC): Primary | ICD-10-CM

## 2025-04-18 DIAGNOSIS — Z3A.26 26 WEEKS GESTATION OF PREGNANCY: ICD-10-CM

## 2025-04-18 PROCEDURE — PNV: Performed by: PHYSICIAN ASSISTANT

## 2025-04-18 NOTE — PROGRESS NOTES
Patient is a 35 YO  female presenting to the office at 26.4 weeks for routine OB care.   BP: 114/70  TWlb  Fetal Movement: yes good movement   LOF: no  VB: no  CTX: no  28 week labs ordered  Reviewed precautions  Call for concerns  RTO 2 weeks

## 2025-04-21 ENCOUNTER — CLINICAL SUPPORT (OUTPATIENT)
Age: 35
End: 2025-04-21

## 2025-04-21 DIAGNOSIS — Z32.2 ENCOUNTER FOR CHILDBIRTH INSTRUCTION: Primary | ICD-10-CM

## 2025-04-23 ENCOUNTER — CLINICAL SUPPORT (OUTPATIENT)
Age: 35
End: 2025-04-23

## 2025-04-23 DIAGNOSIS — Z32.2 ENCOUNTER FOR CHILDBIRTH INSTRUCTION: Primary | ICD-10-CM

## 2025-04-30 ENCOUNTER — CLINICAL SUPPORT (OUTPATIENT)
Age: 35
End: 2025-04-30

## 2025-04-30 DIAGNOSIS — Z32.2 ENCOUNTER FOR CHILDBIRTH INSTRUCTION: Primary | ICD-10-CM

## 2025-04-30 NOTE — PATIENT INSTRUCTIONS
"Patient Education     Your baby's movement before birth   The Basics   Written by the doctors and editors at [facility]   When should I start feeling my baby move? -- It depends. Most people first feel their baby moving in the uterus between about 16 and 20 weeks of pregnancy. It might take longer to feel movement if this is your first pregnancy or if the placenta is in the front of your uterus.  What kinds of movements should I feel? -- When you first feel your baby move, it might feel like a gentle flutter in your belly. This is sometimes called \"quickening.\" As the baby grows, their movements will get stronger. You will probably feel them kicking, rolling, and stretching. Later in pregnancy, you might be able to see and feel the baby moving from the outside.  You might notice that your baby is more active at certain times of the day or night. Even before birth, babies have periods of being asleep and awake. When your baby is sleeping, you might notice that they do not move as much.  Should I keep track of my baby's movements? -- If your pregnancy is healthy, you probably do not need to count or record your baby's movements. Feeling regular movement is a good sign that the baby is doing well.  In some cases, your doctor or midwife might ask you to keep track of your baby's movements. If so, they will tell you how to do this and when to call them.  A change in your baby's movements does not always mean that there is a problem. But in some cases, it can be a sign that the baby is having trouble. If your doctor or midwife is concerned, they can do tests to check on the baby.  If I am asked to track movement, how should I do it? -- There are different ways of tracking your baby's movement. This is sometimes called \"kick counting.\"  Your doctor or midwife will tell you exactly what to track. For example, they might ask you to write down:   How long it takes to feel 10 kicks or movements   How many times your baby moves " in 1 hour  Many experts consider at least 10 movements in 2 hours to be a sign that the baby is doing well. But there is no specific cutoff for exactly how much movement is healthy or unhealthy. Some babies are more active than others, and some pregnant people feel movement more easily than others. The main goal of kick counting is to get to know your baby's normal patterns so you can tell if anything changes.  If you are doing kick counting:   Choose a time of day when your baby is usually active.   Find a quiet place where you will not be distracted.   Lie down on your side in a comfortable position.   Check the clock, or set a timer.   Each time you feel your baby move or kick, write down the time. Some people use a smartphone antione to keep track.   If your baby seems less active than usual, try moving around, eating a snack, and emptying your bladder. This can help wake the baby up if they are asleep.   Stop counting after you have felt 10 kicks, or after the length of time your doctor or midwife told you.  When should I call the doctor? -- Call your doctor or midwife for advice if:   You have concerns about your baby's movement.   Your baby is moving less than they normally do.   You notice a sudden change in the pattern of your baby's movements.   You have any other symptoms that worry you.  All topics are updated as new evidence becomes available and our peer review process is complete.  This topic retrieved from [employer] on: Feb 26, 2024.  Topic 349959 Version 1.0  Release: 32.2.4 - C32.56  © 2024 UpToDate, Inc. and/or its affiliates. All rights reserved.  Consumer Information Use and Disclaimer   Disclaimer: This generalized information is a limited summary of diagnosis, treatment, and/or medication information. It is not meant to be comprehensive and should be used as a tool to help the user understand and/or assess potential diagnostic and treatment options. It does NOT include all information about  conditions, treatments, medications, side effects, or risks that may apply to a specific patient. It is not intended to be medical advice or a substitute for the medical advice, diagnosis, or treatment of a health care provider based on the health care provider's examination and assessment of a patient's specific and unique circumstances. Patients must speak with a health care provider for complete information about their health, medical questions, and treatment options, including any risks or benefits regarding use of medications. This information does not endorse any treatments or medications as safe, effective, or approved for treating a specific patient. UpToDate, Inc. and its affiliates disclaim any warranty or liability relating to this information or the use thereof.The use of this information is governed by the Terms of Use, available at https://www.BlueSnap.com/en/know/clinical-effectiveness-terms. © UpToDate, Inc. and its affiliates and/or licensors. All rights reserved.  Copyright   ©  UpToDate, Inc. and/or its affiliates. All rights reserved.  Thank you for choosing us for your  care today.  If you have any questions about your ultrasound or care, please do not hesitate to contact us or your primary obstetrician.        Some general instructions for your pregnancy are:    Exercise: Aim for 150 minutes per week of regular exercise.  Walking is great!  Nutrition: Choose healthy sources of calcium, iron, and protein.  Avoid ultraprocessed foods and added sugar.  Learn about Preeclampsia: preeclampsia is a common, potentially serious high blood pressure complication in pregnancy.  A blood pressure of 140mmHg (systolic or top number) or 90mmHg (diastolic or bottom number) should be evaluated by your doctor.  Aspirin is sometimes prescribed in early pregnancy to prevent preeclampsia in women with risk factors - ask your obstetrician if you should be on this medication.  For more  resources, visit:  https://www.highriskpregnancyinfo.org/preeclampsia  If you smoke, please try to quit completely but also try to reduce your smoking by as much as possible (as soon as possible).  Do not vape.  Please also avoid cannabis products.  Other warning signs to watch out for in pregnancy or postpartum: chest pain, obstructed breathing or shortness of breath, seizures, thoughts of hurting yourself or your baby, bleeding, a painful or swollen leg, fever, or headache (see [facility] POST-BIRTH Warning Signs campaign).  If these happen call 911.  Itching is also not normal in pregnancy and if you experience this, especially over your hands and feet, potentially worse at night, notify your doctors.

## 2025-05-01 NOTE — PROGRESS NOTES
Please refer to the [facility] ultrasound report in Ob Procedures for additional information regarding today's visit

## 2025-05-02 ENCOUNTER — ROUTINE PRENATAL (OUTPATIENT)
Dept: OBGYN CLINIC | Facility: CLINIC | Age: 35
End: 2025-05-02

## 2025-05-02 ENCOUNTER — ULTRASOUND (OUTPATIENT)
Facility: HOSPITAL | Age: 35
End: 2025-05-02
Payer: COMMERCIAL

## 2025-05-02 VITALS
BODY MASS INDEX: 25.97 KG/M2 | OXYGEN SATURATION: 98 % | DIASTOLIC BLOOD PRESSURE: 58 MMHG | SYSTOLIC BLOOD PRESSURE: 100 MMHG | HEART RATE: 91 BPM | WEIGHT: 161.6 LBS | HEIGHT: 66 IN

## 2025-05-02 VITALS — WEIGHT: 162 LBS | SYSTOLIC BLOOD PRESSURE: 102 MMHG | DIASTOLIC BLOOD PRESSURE: 60 MMHG | BODY MASS INDEX: 26.15 KG/M2

## 2025-05-02 DIAGNOSIS — Z3A.28 28 WEEKS GESTATION OF PREGNANCY: ICD-10-CM

## 2025-05-02 DIAGNOSIS — O99.119 ANTIPHOSPHOLIPID SYNDROME DURING PREGNANCY (HCC): Primary | ICD-10-CM

## 2025-05-02 DIAGNOSIS — Z3A.28 28 WEEKS GESTATION OF PREGNANCY: Primary | ICD-10-CM

## 2025-05-02 DIAGNOSIS — D68.61 ANTIPHOSPHOLIPID SYNDROME DURING PREGNANCY (HCC): Primary | ICD-10-CM

## 2025-05-02 DIAGNOSIS — D68.61 ANTIPHOSPHOLIPID SYNDROME DURING PREGNANCY (HCC): ICD-10-CM

## 2025-05-02 DIAGNOSIS — O99.119 ANTIPHOSPHOLIPID SYNDROME DURING PREGNANCY (HCC): ICD-10-CM

## 2025-05-02 LAB
DME PARACHUTE DELIVERY DATE REQUESTED: NORMAL
DME PARACHUTE ITEM DESCRIPTION: NORMAL
DME PARACHUTE ORDER STATUS: NORMAL
DME PARACHUTE SUPPLIER NAME: NORMAL
DME PARACHUTE SUPPLIER PHONE: NORMAL

## 2025-05-02 PROCEDURE — 76816 OB US FOLLOW-UP PER FETUS: CPT | Performed by: OBSTETRICS & GYNECOLOGY

## 2025-05-02 PROCEDURE — PNV: Performed by: OBSTETRICS & GYNECOLOGY

## 2025-05-02 NOTE — PROGRESS NOTES
34 y.o.  female at 28w4d (Estimated Date of Delivery: 25) for PNV.    Pre-Perla Vitals      Flowsheet Row Most Recent Value   Prenatal Assessment    Fetal Heart Rate 145   Movement Present   Prenatal Vitals    Blood Pressure 102/60   Weight - Scale 73.5 kg (162 lb)   Urine Albumin/Glucose    Dilation/Effacement/Station    Vaginal Drainage    Edema           TWG: 10.9 kg (24 lb)    28 wk labs reviewed.   Red folder given and reviewed. Discussed Fetal kick counts, PTL/Labor information, Baby & Me Classes.     Consent signed - ok with transfusion, full code.   Current visitor policy reviewed.   Patient plans to breastfeed.   Skin to skin, rooming in, delayed cord clamp, pain management in labor discussed.    TDAP was offered and declined by the patient.  Rhogam was not indicated.    Leakage of fluid: no  Vaginal bleeding: no  Contractions/Cramping: no  Fetal movement: yes    Has ultrasound today with MFM- growth     RTO in 2 weeks.

## 2025-05-02 NOTE — PROGRESS NOTES
Jeana Leakage     Jeana Bleeding     Jeana Cramping / Contractions     Pt thinks she may have felt some B.H. the other day, pt said she felt some tightening.

## 2025-05-02 NOTE — LETTER
May 2, 2025     Beulah Harkins MD  [address]  [address]  [place] [address]    Patient: Camilla Cuevas   YOB: 1990   Date of Visit: 5/2/2025       Dear Dr. Beulah Harkins MD:    Thank you for referring Camilla Cuevas to me for evaluation. Below are my notes for this consultation.    If you have questions, please do not hesitate to call me. I look forward to following your patient along with you.         Sincerely,        Jasiel Roberts MD        CC: No Recipients    Jasiel Roberts MD  5/2/2025  1:12 PM  Sign when Signing Visit  Please refer to the [facility] ultrasound report in Ob Procedures for additional information regarding today's visit

## 2025-05-07 ENCOUNTER — APPOINTMENT (OUTPATIENT)
Dept: LAB | Facility: CLINIC | Age: 35
End: 2025-05-07
Attending: OBSTETRICS & GYNECOLOGY
Payer: COMMERCIAL

## 2025-05-07 ENCOUNTER — RESULTS FOLLOW-UP (OUTPATIENT)
Dept: OBGYN CLINIC | Facility: CLINIC | Age: 35
End: 2025-05-07

## 2025-05-07 DIAGNOSIS — Z3A.22 22 WEEKS GESTATION OF PREGNANCY: ICD-10-CM

## 2025-05-07 DIAGNOSIS — D68.61 ANTIPHOSPHOLIPID SYNDROME DURING PREGNANCY (HCC): ICD-10-CM

## 2025-05-07 DIAGNOSIS — O99.119 ANTIPHOSPHOLIPID SYNDROME DURING PREGNANCY (HCC): ICD-10-CM

## 2025-05-07 DIAGNOSIS — Z3A.14 14 WEEKS GESTATION OF PREGNANCY: ICD-10-CM

## 2025-05-07 LAB
ERYTHROCYTE [DISTWIDTH] IN BLOOD BY AUTOMATED COUNT: 12.4 % (ref 11.6–15.1)
GLUCOSE 1H P 50 G GLC PO SERPL-MCNC: 134 MG/DL (ref 70–134)
HCT VFR BLD AUTO: 34.2 % (ref 34.8–46.1)
HGB BLD-MCNC: 11.3 G/DL (ref 11.5–15.4)
MCH RBC QN AUTO: 30.7 PG (ref 26.8–34.3)
MCHC RBC AUTO-ENTMCNC: 33 G/DL (ref 31.4–37.4)
MCV RBC AUTO: 93 FL (ref 82–98)
PLATELET # BLD AUTO: 319 THOUSANDS/UL (ref 149–390)
PMV BLD AUTO: 8.7 FL (ref 8.9–12.7)
RBC # BLD AUTO: 3.68 MILLION/UL (ref 3.81–5.12)
TREPONEMA PALLIDUM IGG+IGM AB [PRESENCE] IN SERUM OR PLASMA BY IMMUNOASSAY: NORMAL
WBC # BLD AUTO: 11.12 THOUSAND/UL (ref 4.31–10.16)

## 2025-05-07 PROCEDURE — 85027 COMPLETE CBC AUTOMATED: CPT

## 2025-05-07 PROCEDURE — 86780 TREPONEMA PALLIDUM: CPT

## 2025-05-07 PROCEDURE — 82950 GLUCOSE TEST: CPT

## 2025-05-07 PROCEDURE — 82105 ALPHA-FETOPROTEIN SERUM: CPT

## 2025-05-07 PROCEDURE — 36415 COLL VENOUS BLD VENIPUNCTURE: CPT

## 2025-05-10 LAB
2ND TRIMESTER 4 SCREEN SERPL-IMP: NORMAL
AFP ADJ MOM SERPL: NORMAL
AFP INTERP AMN-IMP: NORMAL
AFP INTERP SERPL-IMP: NORMAL
AFP INTERP SERPL-IMP: NORMAL
AFP SERPL-MCNC: 227.9 NG/ML
AGE AT DELIVERY: 34.1 YR
GA METHOD: NORMAL
GA: 29.3 WEEKS
IDDM PATIENT QL: NO
MULTIPLE PREGNANCY: NO
NEURAL TUBE DEFECT RISK FETUS: NORMAL %

## 2025-05-16 ENCOUNTER — ROUTINE PRENATAL (OUTPATIENT)
Dept: OBGYN CLINIC | Facility: CLINIC | Age: 35
End: 2025-05-16

## 2025-05-16 VITALS — SYSTOLIC BLOOD PRESSURE: 100 MMHG | DIASTOLIC BLOOD PRESSURE: 64 MMHG | BODY MASS INDEX: 26.86 KG/M2 | WEIGHT: 166.4 LBS

## 2025-05-16 DIAGNOSIS — O99.119 ANTIPHOSPHOLIPID SYNDROME DURING PREGNANCY (HCC): ICD-10-CM

## 2025-05-16 DIAGNOSIS — Z3A.30 30 WEEKS GESTATION OF PREGNANCY: Primary | ICD-10-CM

## 2025-05-16 DIAGNOSIS — D68.61 ANTIPHOSPHOLIPID SYNDROME DURING PREGNANCY (HCC): ICD-10-CM

## 2025-05-16 NOTE — PROGRESS NOTES
34 y.o.   female at 30.4 wga for PNV. BP : 100/64. TW  + FM, - LOF, - Ctxn, - bleeding  Feeling well.  No complaints  Growth at 32 weeks  Declines tdap  Reviewed birth plan - discussed baby meds - consider doing vitK  Discussed ECV vs 1C/s if baby continues to be breech  Reviewed PTL precautions and FKCs  F/u 2 weeks

## 2025-05-22 ENCOUNTER — TELEPHONE (OUTPATIENT)
Dept: OBGYN CLINIC | Facility: CLINIC | Age: 35
End: 2025-05-22

## 2025-05-22 LAB
DME PARACHUTE DELIVERY DATE ACTUAL: NORMAL
DME PARACHUTE DELIVERY DATE REQUESTED: NORMAL
DME PARACHUTE ITEM DESCRIPTION: NORMAL
DME PARACHUTE ORDER STATUS: NORMAL
DME PARACHUTE SUPPLIER NAME: NORMAL
DME PARACHUTE SUPPLIER PHONE: NORMAL

## 2025-05-22 NOTE — TELEPHONE ENCOUNTER
Patient was called for 3rd  trimester follow up.  Left a voicemail.  Patient was reminded that she can message this nurse via My Chart and If  having any symptoms or concerns to Please call [phone].    CONSTANTINO Neil  OB Nurse Navigator

## 2025-05-29 ENCOUNTER — TELEPHONE (OUTPATIENT)
Dept: OBGYN CLINIC | Facility: CLINIC | Age: 35
End: 2025-05-29

## 2025-05-29 NOTE — TELEPHONE ENCOUNTER
Patient was called for 3rd  trimester follow up.  Left a voicemail.  Patient was reminded that she can message this nurse via My Chart and If  having any symptoms or concerns to Please call [phone].    CONSTANTINO Neil  OB Nurse Navigator

## 2025-05-30 ENCOUNTER — ROUTINE PRENATAL (OUTPATIENT)
Dept: OBGYN CLINIC | Facility: CLINIC | Age: 35
End: 2025-05-30

## 2025-05-30 VITALS — SYSTOLIC BLOOD PRESSURE: 114 MMHG | DIASTOLIC BLOOD PRESSURE: 70 MMHG | WEIGHT: 166 LBS | BODY MASS INDEX: 26.79 KG/M2

## 2025-05-30 DIAGNOSIS — O99.119 ANTIPHOSPHOLIPID SYNDROME DURING PREGNANCY (HCC): Primary | ICD-10-CM

## 2025-05-30 DIAGNOSIS — D68.61 ANTIPHOSPHOLIPID SYNDROME DURING PREGNANCY (HCC): Primary | ICD-10-CM

## 2025-05-30 DIAGNOSIS — Z3A.32 32 WEEKS GESTATION OF PREGNANCY: ICD-10-CM

## 2025-05-30 RX ORDER — ENOXAPARIN SODIUM 100 MG/ML
40 INJECTION SUBCUTANEOUS DAILY
Qty: 36 ML | Refills: 1 | Status: SHIPPED | OUTPATIENT
Start: 2025-05-30 | End: 2025-11-26

## 2025-05-30 NOTE — PROGRESS NOTES
35 y.o.  female at 32w4d (Estimated Date of Delivery: 25) for PNV.    Pre-Perla Vitals      Flowsheet Row Most Recent Value   Prenatal Assessment    Fetal Heart Rate 125   Movement Present   Prenatal Vitals    Blood Pressure 114/70   Weight - Scale 75.3 kg (166 lb)   Urine Albumin/Glucose    Dilation/Effacement/Station    Vaginal Drainage    Edema           TW.7 kg (28 lb)    Leakage of fluid: no  Vaginal bleeding: no  Contractions/Cramping: no  Fetal movement: yes  Antiphospholipid syndrome - on ppx lovenox and aspirin. Needs refills of lovenox.    - NST weekly starting at 32wks. NST performed today.   NST today:    Nonstress Test  Reason for NST: Other Obstetric Risk Factors, Other Medical Risk Factors  Other Medical Risk Factors: Antiphospholipid Syndrome  Variability: Moderate  Maternal Pulse: 77  Decelerations: None  Accelerations: Yes  Acoustic Stimulator: No  Baseline: 125 BPM  Uterine Irritability: No  Contractions: Not present    RTO in 2 weeks.

## 2025-06-02 ENCOUNTER — ROUTINE PRENATAL (OUTPATIENT)
Facility: HOSPITAL | Age: 35
End: 2025-06-02
Payer: COMMERCIAL

## 2025-06-02 VITALS
HEART RATE: 74 BPM | DIASTOLIC BLOOD PRESSURE: 60 MMHG | WEIGHT: 166.8 LBS | HEIGHT: 66 IN | BODY MASS INDEX: 26.81 KG/M2 | SYSTOLIC BLOOD PRESSURE: 102 MMHG

## 2025-06-02 DIAGNOSIS — Z3A.33 33 WEEKS GESTATION OF PREGNANCY: Primary | ICD-10-CM

## 2025-06-02 DIAGNOSIS — O99.119 ANTIPHOSPHOLIPID SYNDROME DURING PREGNANCY (HCC): ICD-10-CM

## 2025-06-02 DIAGNOSIS — D68.61 ANTIPHOSPHOLIPID SYNDROME DURING PREGNANCY (HCC): ICD-10-CM

## 2025-06-02 PROCEDURE — 59025 FETAL NON-STRESS TEST: CPT | Performed by: OBSTETRICS & GYNECOLOGY

## 2025-06-02 RX ORDER — DOCUSATE SODIUM 100 MG/1
100 CAPSULE, LIQUID FILLED ORAL AS NEEDED
COMMUNITY

## 2025-06-02 NOTE — PROGRESS NOTES
Non-Stress Testing:    Non-Stress test, equipment, procedure, and expected outcomes explained. Reviewed fetal kick counts and when to call OB.Verified patient understanding of fetal kick counts with teach back method. Patient reports feeling daily fetal movements. Patient has no questions or concerns.     Reviewed non-stress test with Dr. Atkins in-person

## 2025-06-02 NOTE — LETTER
NST sleeve cover sheet    Patient name: Camilla Cuevas  : 1990  MRN: [id number]    ARISTEO: Estimated Date of Delivery: 25    Obstetrician: Mayelin    Reason(s) for testing: Antiphospholipid syndrome    Testing frequency:    ___  2x/wk  ___  1x/wk  ___  Dopplers  ___  BPP?      Last growth scan: __________, _________, _________    Baby:      Male   /   Female   /   [place]             Baby Name: ___________________            IOL or  C/S: _____________________

## 2025-06-05 ENCOUNTER — ANCILLARY PROCEDURE (OUTPATIENT)
Facility: HOSPITAL | Age: 35
End: 2025-06-05
Attending: OBSTETRICS & GYNECOLOGY
Payer: COMMERCIAL

## 2025-06-05 ENCOUNTER — ULTRASOUND (OUTPATIENT)
Facility: HOSPITAL | Age: 35
End: 2025-06-05
Attending: OBSTETRICS & GYNECOLOGY
Payer: COMMERCIAL

## 2025-06-05 VITALS
BODY MASS INDEX: 26.74 KG/M2 | DIASTOLIC BLOOD PRESSURE: 60 MMHG | SYSTOLIC BLOOD PRESSURE: 106 MMHG | HEART RATE: 79 BPM | WEIGHT: 166.4 LBS | HEIGHT: 66 IN

## 2025-06-05 DIAGNOSIS — Z3A.20 20 WEEKS GESTATION OF PREGNANCY: ICD-10-CM

## 2025-06-05 DIAGNOSIS — Z3A.33 33 WEEKS GESTATION OF PREGNANCY: Primary | ICD-10-CM

## 2025-06-05 PROCEDURE — 59025 FETAL NON-STRESS TEST: CPT | Performed by: OBSTETRICS & GYNECOLOGY

## 2025-06-05 PROCEDURE — 76816 OB US FOLLOW-UP PER FETUS: CPT | Performed by: OBSTETRICS & GYNECOLOGY

## 2025-06-05 PROCEDURE — 99212 OFFICE O/P EST SF 10 MIN: CPT | Performed by: OBSTETRICS & GYNECOLOGY

## 2025-06-05 NOTE — PROGRESS NOTES
Repeat Non-Stress Testing:    Patient verbalizes +FM. Pt denies ALL:               Leaking of fluid   Contractions   Vaginal bleeding   Decreased fetal movement    Patient is performing daily kick counts. Patient has no questions or concerns.   NST strip reviewed by Dr. Ayoub in-person.

## 2025-06-09 DIAGNOSIS — O99.119 ANTIPHOSPHOLIPID SYNDROME DURING PREGNANCY (HCC): ICD-10-CM

## 2025-06-09 DIAGNOSIS — D68.61 ANTIPHOSPHOLIPID SYNDROME DURING PREGNANCY (HCC): ICD-10-CM

## 2025-06-09 RX ORDER — ENOXAPARIN SODIUM 100 MG/ML
40 INJECTION SUBCUTANEOUS DAILY
Qty: 36 ML | Refills: 0 | Status: CANCELLED | OUTPATIENT
Start: 2025-06-09 | End: 2025-12-06

## 2025-06-09 NOTE — PROGRESS NOTES
[facility]: Camilla was seen today for NST (found under the pregnancy episode) which I reviewed the RN assessment and agree, and fetal growth ultrasound (report with images are contained in the ultrasound report located under Chart Review tab in Epic)..   The time spent on this established patient on the encounter date included 3 minutes previsit service time reviewing records and precharting, 5 minutes face-to-face service time counseling regarding results and coordinating care, and  4 minutes charting, totalling 12 minutes.  Please don't hesitate to contact our office with any concerns or questions.  -Ana Luisa Ayoub MD

## 2025-06-11 ENCOUNTER — ROUTINE PRENATAL (OUTPATIENT)
Facility: HOSPITAL | Age: 35
End: 2025-06-11
Payer: COMMERCIAL

## 2025-06-11 VITALS — HEIGHT: 66 IN | WEIGHT: 166.6 LBS | HEART RATE: 81 BPM | BODY MASS INDEX: 26.78 KG/M2

## 2025-06-11 DIAGNOSIS — Z3A.34 34 WEEKS GESTATION OF PREGNANCY: Primary | ICD-10-CM

## 2025-06-11 DIAGNOSIS — D68.61 ANTIPHOSPHOLIPID SYNDROME DURING PREGNANCY (HCC): ICD-10-CM

## 2025-06-11 DIAGNOSIS — O99.119 ANTIPHOSPHOLIPID SYNDROME DURING PREGNANCY (HCC): ICD-10-CM

## 2025-06-11 PROCEDURE — 59025 FETAL NON-STRESS TEST: CPT | Performed by: OBSTETRICS & GYNECOLOGY

## 2025-06-11 NOTE — PROGRESS NOTES
Nonstress test at 34-2/7 weeks gestation.    Indication antiphospholipid antibody syndrome.    Interpretation reactive.    Plan:    Twice a week nonstress test, once a week HERMILO, daily fetal movement counts.    Jason Estevez MD, MS CE    Maternal-fetal medicine

## 2025-06-11 NOTE — PROGRESS NOTES
Repeat Non-Stress Testing:    Patient verbalizes +FM. Pt denies ALL:               Leaking of fluid   Contractions   Vaginal bleeding   Decreased fetal movement    Patient is performing daily kick counts. Patient has no questions or concerns.   NST strip reviewed by Dr. Estevez via teams virtual rounding.    The patient was located in [place] at the time of the visit, a state in which Dr. Estevez holds an active license. Patient acknowledged consent and understanding of privacy and security of the video platform. The patient has agreed to participate and understands they can discontinue the visit at any time. The patient was counseled via synchronous telemedicine encounter using Teams Virtual Rounding.

## 2025-06-12 NOTE — TELEPHONE ENCOUNTER
Please call this pharmacy to determine if another prescription needs to be sent. I placed an electronic prescription to [facility] on 5/30/2025 when I saw Camilla in the office. There is a note confirming Receipt from the pharmacy for this medication.

## 2025-06-12 NOTE — PATIENT INSTRUCTIONS
Thank you for choosing us for your  care today.  If you have any questions about your ultrasound or care, please do not hesitate to contact us or your primary obstetrician.        Some general instructions for your pregnancy are:    Exercise: Aim for 150 minutes per week of regular exercise.  Walking is great!  Nutrition: Choose healthy sources of calcium, iron, and protein.  Avoid ultraprocessed foods and added sugar.  Learn about Preeclampsia: preeclampsia is a common, potentially serious high blood pressure complication in pregnancy.  A blood pressure of 140mmHg (systolic or top number) or 90mmHg (diastolic or bottom number) should be evaluated by your doctor.  Aspirin is sometimes prescribed in early pregnancy to prevent preeclampsia in women with risk factors - ask your obstetrician if you should be on this medication.  For more resources, visit:  https://www.highriskpregnancyinfo.org/preeclampsia  If you smoke, please try to quit completely but also try to reduce your smoking by as much as possible (as soon as possible).  Do not vape.  Please also avoid cannabis products.  Other warning signs to watch out for in pregnancy or postpartum: chest pain, obstructed breathing or shortness of breath, seizures, thoughts of hurting yourself or your baby, bleeding, a painful or swollen leg, fever, or headache (see [facility] POST-BIRTH Warning Signs campaign).  If these happen call 911.  Itching is also not normal in pregnancy and if you experience this, especially over your hands and feet, potentially worse at night, notify your doctors.     Kick Counts in Pregnancy   AMBULATORY CARE:   Kick counts  measure how much your baby is moving in your womb. A kick from your baby can be felt as a twist, turn, swish, roll, or jab. It is common to feel your baby kicking at 26 to 28 weeks of pregnancy. You may feel your baby kick as early as 20 weeks of pregnancy. You may want to start counting at 28 weeks.   Contact your  doctor immediately if:   You feel a change in the number of kicks or movements of your baby.      You feel fewer than 10 kicks within 2 hours.      You have questions or concerns about your baby's movements.     Why measure kick counts:  Your baby's movement may provide information about your baby's health. He or she may move less, or not at all, if there are problems. Your baby may move less if he or she is not getting enough oxygen or nutrition from the placenta. Do not smoke while you are pregnant. Smoking decreases the amount of oxygen that gets to your baby. Talk to your healthcare provider if you need help to quit smoking. Tell your healthcare provider as soon as you feel a change in your baby's movements.  When to measure kick counts:   Measure kick counts at the same time every day.       Measure kick counts when your baby is awake and most active. Your baby may be most active in the evening.     How to measure kick counts:  Check that your baby is awake before you measure kick counts. You can wake up your baby by lightly pushing on your belly, walking, or drinking something cold. Your healthcare provider may tell you different ways to measure kick counts. You may be told to do the following:  Use a chart or clock to keep track of the time you start and finish counting.      Sit in a chair or lie on your left side.      Place your hands on the largest part of your belly.      Count until you reach 10 kicks. Write down how much time it takes to count 10 kicks.      It may take 30 minutes to 2 hours to count 10 kicks. It should not take more than 2 hours to count 10 kicks.     Follow up with your doctor as directed:  Write down your questions so you remember to ask them during your visits.   © Copyright [employer] 2023 Information is for End User's use only and may not be sold, redistributed or otherwise used for commercial purposes.  The above information is an  only. It is not intended as  medical advice for individual conditions or treatments. Talk to your doctor, nurse or pharmacist before following any medical regimen to see if it is safe and effective for you.

## 2025-06-13 ENCOUNTER — ULTRASOUND (OUTPATIENT)
Facility: HOSPITAL | Age: 35
End: 2025-06-13
Payer: COMMERCIAL

## 2025-06-13 ENCOUNTER — ROUTINE PRENATAL (OUTPATIENT)
Dept: OBGYN CLINIC | Facility: CLINIC | Age: 35
End: 2025-06-13

## 2025-06-13 ENCOUNTER — ANCILLARY PROCEDURE (OUTPATIENT)
Facility: HOSPITAL | Age: 35
End: 2025-06-13
Attending: OBSTETRICS & GYNECOLOGY
Payer: COMMERCIAL

## 2025-06-13 VITALS
WEIGHT: 167.6 LBS | HEART RATE: 70 BPM | DIASTOLIC BLOOD PRESSURE: 60 MMHG | BODY MASS INDEX: 26.93 KG/M2 | SYSTOLIC BLOOD PRESSURE: 106 MMHG | HEIGHT: 66 IN

## 2025-06-13 VITALS — WEIGHT: 167 LBS | BODY MASS INDEX: 26.95 KG/M2 | SYSTOLIC BLOOD PRESSURE: 116 MMHG | DIASTOLIC BLOOD PRESSURE: 62 MMHG

## 2025-06-13 DIAGNOSIS — O99.119 ANTIPHOSPHOLIPID SYNDROME DURING PREGNANCY (HCC): ICD-10-CM

## 2025-06-13 DIAGNOSIS — D68.61 ANTIPHOSPHOLIPID SYNDROME DURING PREGNANCY (HCC): Primary | ICD-10-CM

## 2025-06-13 DIAGNOSIS — O99.119 ANTIPHOSPHOLIPID SYNDROME DURING PREGNANCY (HCC): Primary | ICD-10-CM

## 2025-06-13 DIAGNOSIS — Z3A.34 34 WEEKS GESTATION OF PREGNANCY: ICD-10-CM

## 2025-06-13 DIAGNOSIS — D68.61 ANTIPHOSPHOLIPID SYNDROME DURING PREGNANCY (HCC): ICD-10-CM

## 2025-06-13 DIAGNOSIS — Z3A.34 34 WEEKS GESTATION OF PREGNANCY: Primary | ICD-10-CM

## 2025-06-13 PROCEDURE — 59025 FETAL NON-STRESS TEST: CPT | Performed by: OBSTETRICS & GYNECOLOGY

## 2025-06-13 PROCEDURE — 76815 OB US LIMITED FETUS(S): CPT | Performed by: OBSTETRICS & GYNECOLOGY

## 2025-06-13 PROCEDURE — PNV: Performed by: OBSTETRICS & GYNECOLOGY

## 2025-06-13 NOTE — PROGRESS NOTES
3rd Trimester Visit  Name: Camilla Cuevas      : 1990      MRN: [id number]  Encounter Provider: Beulah Harkins MD  Encounter Date: 2025   Encounter department: [facility]    35 y.o.  at 34w4d presenting for routine OB visit at 34w4d.:  Assessment & Plan  Antiphospholipid syndrome during pregnancy (HCC)     APFS scheduled   Wants 39w IOL   34 weeks gestation of pregnancy       Scheduled for ultrasound at 36w for repeat growth.  Reviewed premature labor precautions and fetal kick counts.  Advised to continue medications and return in 1 weeks.    History of Present Illness     She reports feeling well.  Denies uterine contractions.  Denies vaginal bleeding or leaking of fluid.  Reports adequate fetal movement of at least 10 movements in 2 hours once daily.     Current Outpatient Medications   Medication Instructions    aspirin (FOR EMS ONLY) 81 mg, Daily    cholecalciferol (VITAMIN D3) 400 Units, Daily    docusate sodium (COLACE) 100 mg, As needed    Doxylamine Succinate, Sleep, (UNISOM PO) Take by mouth    enoxaparin (LOVENOX) 40 mg, Subcutaneous, Daily    LORazepam (ATIVAN) 0.5 mg tablet as needed    polyethylene glycol (MiraLax Mix-In [place]) 17 g packet     Prenatal MV-Min-Fe Fum-FA-DHA (PRENATAL 1 PO) Take by mouth    psyllium (METAMUCIL) 58.6 % packet 1 packet, Daily     Objective   /62   Wt 75.8 kg (167 lb)   LMP 10/14/2024   BMI 26.95 kg/m²      BP: Blood Pressure: 116/62  Wt: 75.8 kg (167 lb); Body mass index is 26.95 kg/m².; TWG=13.2 kg (29 lb)  Fetal Heart Rate: 140;      Abdomen: ”soft”,”non tender”    Pregnancy Problems (from 24 to present)       Problem Noted Diagnosed Resolved    33 weeks gestation of pregnancy 2025 by Beulah Harkins MD  No    Antiphospholipid syndrome during pregnancy (HCC) 2025 by Beulah Harkins MD  No

## 2025-06-13 NOTE — PROGRESS NOTES
The patient was seen today for an ultrasound and NST.  Please see ultrasound report (located under Imaging tab) for additional details.

## 2025-06-13 NOTE — PROGRESS NOTES
Repeat Non-Stress Testing:    Patient verbalizes +FM. Pt denies ALL:               Leaking of fluid   Contractions   Vaginal bleeding   Decreased fetal movement    Patient is performing daily kick counts. Patient has no questions or concerns.   NST strip reviewed by Dr. Atkins in-person.

## 2025-06-16 ENCOUNTER — ROUTINE PRENATAL (OUTPATIENT)
Facility: HOSPITAL | Age: 35
End: 2025-06-16
Payer: COMMERCIAL

## 2025-06-16 VITALS
HEIGHT: 66 IN | HEART RATE: 83 BPM | SYSTOLIC BLOOD PRESSURE: 102 MMHG | BODY MASS INDEX: 27.19 KG/M2 | WEIGHT: 169.2 LBS | DIASTOLIC BLOOD PRESSURE: 60 MMHG

## 2025-06-16 DIAGNOSIS — O99.119 ANTIPHOSPHOLIPID SYNDROME DURING PREGNANCY (HCC): ICD-10-CM

## 2025-06-16 DIAGNOSIS — Z3A.35 35 WEEKS GESTATION OF PREGNANCY: Primary | ICD-10-CM

## 2025-06-16 DIAGNOSIS — D68.61 ANTIPHOSPHOLIPID SYNDROME DURING PREGNANCY (HCC): ICD-10-CM

## 2025-06-16 PROCEDURE — 59025 FETAL NON-STRESS TEST: CPT | Performed by: OBSTETRICS & GYNECOLOGY

## 2025-06-16 NOTE — TELEPHONE ENCOUNTER
I had called [facility] pharmacy and confirmed that they have the prescription Dr. Stephen sent on 5/30 for enoxaparin. The script is for 90 days with one refill and the patient is not due for another refill to be shipped out until 6/25/2025. I had left a message for the patient to let her know.

## 2025-06-16 NOTE — PROGRESS NOTES
Nonstress test at 35-0/7 weeks.    Indication antiphospholipid antibody syndrome.    Interpretation reactive.    Plan.    Twice a week nonstress test once a week HERMILO Daily fetal movement counts.    Jason Estevez MD, MSCE    Maternal-fetal medicine

## 2025-06-19 ENCOUNTER — ANCILLARY PROCEDURE (OUTPATIENT)
Facility: HOSPITAL | Age: 35
End: 2025-06-19
Attending: OBSTETRICS & GYNECOLOGY
Payer: COMMERCIAL

## 2025-06-19 ENCOUNTER — ULTRASOUND (OUTPATIENT)
Facility: HOSPITAL | Age: 35
End: 2025-06-19
Payer: COMMERCIAL

## 2025-06-19 VITALS
SYSTOLIC BLOOD PRESSURE: 112 MMHG | WEIGHT: 169.4 LBS | DIASTOLIC BLOOD PRESSURE: 64 MMHG | BODY MASS INDEX: 27.23 KG/M2 | HEIGHT: 66 IN | HEART RATE: 75 BPM

## 2025-06-19 DIAGNOSIS — D68.61 ANTIPHOSPHOLIPID SYNDROME DURING PREGNANCY (HCC): ICD-10-CM

## 2025-06-19 DIAGNOSIS — Z3A.35 35 WEEKS GESTATION OF PREGNANCY: Primary | ICD-10-CM

## 2025-06-19 DIAGNOSIS — Z3A.35 35 WEEKS GESTATION OF PREGNANCY: ICD-10-CM

## 2025-06-19 DIAGNOSIS — O99.119 ANTIPHOSPHOLIPID SYNDROME DURING PREGNANCY (HCC): ICD-10-CM

## 2025-06-19 PROCEDURE — 76815 OB US LIMITED FETUS(S): CPT | Performed by: OBSTETRICS & GYNECOLOGY

## 2025-06-19 PROCEDURE — 59025 FETAL NON-STRESS TEST: CPT | Performed by: OBSTETRICS & GYNECOLOGY

## 2025-06-23 ENCOUNTER — ROUTINE PRENATAL (OUTPATIENT)
Facility: HOSPITAL | Age: 35
End: 2025-06-23
Payer: COMMERCIAL

## 2025-06-23 VITALS — BODY MASS INDEX: 27.42 KG/M2 | HEIGHT: 66 IN | HEART RATE: 73 BPM | WEIGHT: 170.6 LBS

## 2025-06-23 DIAGNOSIS — O99.119 ANTIPHOSPHOLIPID SYNDROME DURING PREGNANCY (HCC): ICD-10-CM

## 2025-06-23 DIAGNOSIS — D68.61 ANTIPHOSPHOLIPID SYNDROME DURING PREGNANCY (HCC): ICD-10-CM

## 2025-06-23 DIAGNOSIS — Z3A.36 36 WEEKS GESTATION OF PREGNANCY: Primary | ICD-10-CM

## 2025-06-23 PROCEDURE — 59025 FETAL NON-STRESS TEST: CPT | Performed by: OBSTETRICS & GYNECOLOGY

## 2025-06-26 ENCOUNTER — ANCILLARY PROCEDURE (OUTPATIENT)
Facility: HOSPITAL | Age: 35
End: 2025-06-26
Attending: OBSTETRICS & GYNECOLOGY
Payer: COMMERCIAL

## 2025-06-26 ENCOUNTER — ULTRASOUND (OUTPATIENT)
Facility: HOSPITAL | Age: 35
End: 2025-06-26
Payer: COMMERCIAL

## 2025-06-26 VITALS
HEIGHT: 66 IN | SYSTOLIC BLOOD PRESSURE: 118 MMHG | WEIGHT: 172 LBS | BODY MASS INDEX: 27.64 KG/M2 | DIASTOLIC BLOOD PRESSURE: 60 MMHG | HEART RATE: 59 BPM

## 2025-06-26 DIAGNOSIS — Z3A.36 36 WEEKS GESTATION OF PREGNANCY: ICD-10-CM

## 2025-06-26 DIAGNOSIS — Z3A.36 36 WEEKS GESTATION OF PREGNANCY: Primary | ICD-10-CM

## 2025-06-26 DIAGNOSIS — O99.119 ANTIPHOSPHOLIPID SYNDROME DURING PREGNANCY (HCC): ICD-10-CM

## 2025-06-26 DIAGNOSIS — D68.61 ANTIPHOSPHOLIPID SYNDROME DURING PREGNANCY (HCC): ICD-10-CM

## 2025-06-26 PROCEDURE — 59025 FETAL NON-STRESS TEST: CPT | Performed by: OBSTETRICS & GYNECOLOGY

## 2025-06-26 PROCEDURE — 76816 OB US FOLLOW-UP PER FETUS: CPT | Performed by: OBSTETRICS & GYNECOLOGY

## 2025-06-26 PROCEDURE — NC001 PR NO CHARGE: Performed by: OBSTETRICS & GYNECOLOGY

## 2025-06-27 ENCOUNTER — ROUTINE PRENATAL (OUTPATIENT)
Dept: OBGYN CLINIC | Facility: CLINIC | Age: 35
End: 2025-06-27

## 2025-06-27 VITALS — BODY MASS INDEX: 27.76 KG/M2 | WEIGHT: 172 LBS | SYSTOLIC BLOOD PRESSURE: 100 MMHG | DIASTOLIC BLOOD PRESSURE: 60 MMHG

## 2025-06-27 DIAGNOSIS — D68.61 ANTIPHOSPHOLIPID SYNDROME DURING PREGNANCY (HCC): Primary | ICD-10-CM

## 2025-06-27 DIAGNOSIS — Z3A.36 36 WEEKS GESTATION OF PREGNANCY: ICD-10-CM

## 2025-06-27 DIAGNOSIS — O99.119 ANTIPHOSPHOLIPID SYNDROME DURING PREGNANCY (HCC): Primary | ICD-10-CM

## 2025-06-27 PROCEDURE — PNV: Performed by: OBSTETRICS & GYNECOLOGY

## 2025-06-27 NOTE — PROGRESS NOTES
3rd Trimester Visit  Name: Camilla Cuevas      : 1990      MRN: [id number]  Encounter Provider: Maryjo Stephen MD  Encounter Date: 2025   Encounter department: [facility]    35 y.o.  at 36w4d presenting for routine OB visit at 36w4d.:  Assessment & Plan  Antiphospholipid syndrome during pregnancy (HCC)  Currently on lovenox ppx.   Discussed delivery plan with anticoagulation. Reviewed epidural placement pending lovenox injection timing due to risks of epidural hematoma.   Interested in 39wk IOL to help with timing.   Orders:    Chlamydia/Gonococcus, ALE    Strep Gp B ALE+Rflx    36 weeks gestation of pregnancy    Orders:    Chlamydia/Gonococcus, ALE    Strep Gp B ALE+Rflx        Reviewed premature labor precautions and fetal kick counts.  Advised to continue medications and return in 1 weeks.    History of Present Illness     She reports pelvic pressure.  Reports occasional uterine contractions/cramping.  Denies vaginal bleeding or leaking of fluid.  Reports adequate fetal movement of at least 10 movements in 2 hours once daily.     Current Outpatient Medications   Medication Instructions    cholecalciferol (VITAMIN D3) 400 Units, Daily    docusate sodium (COLACE) 100 mg, As needed    Doxylamine Succinate, Sleep, (UNISOM PO) Take by mouth    enoxaparin (LOVENOX) 40 mg, Subcutaneous, Daily    LORazepam (ATIVAN) 0.5 mg tablet as needed    polyethylene glycol (MiraLax Mix-In [place]) 17 g packet     Prenatal MV-Min-Fe Fum-FA-DHA (PRENATAL 1 PO) Take by mouth    psyllium (METAMUCIL) 58.6 % packet 1 packet, Daily     Objective   /60   Wt 78 kg (172 lb)   LMP 10/14/2024   BMI 27.76 kg/m²      BP: Blood Pressure: 100/60  Wt: 78 kg (172 lb); Body mass index is 27.76 kg/m².; TWG=15.4 kg (34 lb)  Fetal Heart Rate: 140;      Abdomen: gravid , ”soft”,”non tender”

## 2025-06-27 NOTE — PATIENT INSTRUCTIONS
Am I in labour?    As you enter the final stages of your pregnancy, your body will give signs that labour is approaching. The following information should help you to understand these signs and make it easier for you to determine whether you are in labour.    Some of the signs and symptoms of going into labour may include:    period-like cramps  backache  diarrhoea  mucous discharge or ‘show’  gush or trickle of water as the membranes break  contractions  Engagement  As you move closer to delivery, your baby’s head may drop and become engaged in your pelvis in preparation for labour. If you are expecting your first baby, you may notice pressure in your groin and on your bladder beginning up to four weeks before the birth. You may also notice that you can breathe a little easier and have a little more appetite as your baby drops, and is not pushed up against your diaphragm and stomach quite so much. This is sometimes known as “lightening”, as women generally feel lighter.    Show    During your pregnancy a mucous plug fills your cervix. Towards the end of pregnancy, the cervix becomes softer and this mucous plug may become loosened and start to come away. The process of discharging this mucous is called a ‘show’ and might often contain streaks of blood or may also be brownish in colour. This is different from any flow of fresh blood which you would report immediately to your doctor or the hospital. The show may continue over a period of hours or even days. It is one of the signs that your body is preparing for birth. Labour may begin in the next few days, hours or weeks following a show. There is no need to phone the hospital if you have had a show.    Water breaking (rupture of membranes)    This may occur at any time prior to the start of labour, or at any time during labour. The break may be low, near the opening of the uterus, and will produce a gush of amniotic fluid. If this occurs, place a sanitary pad on and  "note the colour of the fluid. Ring the hospital and tell the midwife that this has occurred. You will usually be asked to come in to the hospital.    Another type of amniotic fluid leak may occur higher up in the amniotic sack, or top of the uterus. This will be less obvious to you and you may only notice a trickle of fluid. Since many women have a heavy vaginal discharge or leak a small amount of urine towards the end of their pregnancy and it is often difficult to tell the difference between urine and amniotic fluid - urine is often yellow; where amniotic fluid is usually clear, or has a pink tinge, and has a \"sweet\" odour. If you are unsure, ring the hospital.    If the colour of the fluid is green or brownish, it indicates that your baby has passed a bowel motion (meconium) inside the uterus. It is very common to have meconium-stained amniotic fluid in a pregnancy over 41 weeks, but this may also be a sign that your baby is distressed. You will need to ring the hospital immediately and then come into the hospital.    Contractions    [place] contractions  [place] contractions are sometimes mistaken for labour. These “practice” contractions usually start correction through the pregnancy and continue right through to the end. These contractions are often irregular and can be uncomfortable and tight. [place] contractions usually increase in regularity and strength towards the end of your pregnancy, preparing your uterus for the birth. Sometimes it is difficult to distinguish between these [place] contractions and labour contractions. Below are the common differences between the two.    Labour contractions  True labour contractions usually increase in strength and duration. In order to time your contractions, time the interval between the start of one contraction to the start of the next. Early labour contractions are often likened to period cramps with or without backache.    [place] " contractions    Labour contractions    usually irregular and short    become regular with time    do not get closer together    get closer together with time    do not get stronger     become stronger    walking does not make them stronger  walking can make them stronger    lying down may make them go away   lying down does not make them go away    uncomfortable and tight--not painful   Painful - can't walk, talk or breathe through them        How does labour start?    Labour can start in different ways. You may be start experiencing some period like pains or contractions. You might notice that these tightenings/contractions start to get stronger, closer and last longer than before. Or you might start with some back ache or a stomach upset that gets stronger and develops into regular contractions. In approximately 10-15% of women, labour will start when your membranes rupture. Contractions usually follow.    Should I call my doctor?    You should call your doctor at [phone] when:    - your jernigan break  - you have bright blood loss  - your contractions are regular and five minutes apart  - if you have decreased fetal movement

## 2025-06-27 NOTE — ASSESSMENT & PLAN NOTE
Currently on lovenox ppx.   Discussed delivery plan with anticoagulation. Reviewed epidural placement pending lovenox injection timing due to risks of epidural hematoma.   Interested in 39wk IOL to help with timing.   Orders:    Chlamydia/Gonococcus, ALE    Strep Gp B ALE+Rflx

## 2025-06-29 ENCOUNTER — NURSE TRIAGE (OUTPATIENT)
Dept: OTHER | Facility: OTHER | Age: 35
End: 2025-06-29

## 2025-06-29 ENCOUNTER — HOSPITAL ENCOUNTER (OUTPATIENT)
Facility: HOSPITAL | Age: 35
Discharge: HOME | End: 2025-06-29
Attending: OBSTETRICS & GYNECOLOGY | Admitting: OBSTETRICS & GYNECOLOGY
Payer: COMMERCIAL

## 2025-06-29 VITALS
TEMPERATURE: 98.3 F | SYSTOLIC BLOOD PRESSURE: 109 MMHG | DIASTOLIC BLOOD PRESSURE: 61 MMHG | BODY MASS INDEX: 27.64 KG/M2 | WEIGHT: 172 LBS | RESPIRATION RATE: 16 BRPM | OXYGEN SATURATION: 98 % | HEIGHT: 66 IN | HEART RATE: 66 BPM

## 2025-06-29 PROCEDURE — 99213 OFFICE O/P EST LOW 20 MIN: CPT

## 2025-06-29 PROCEDURE — G0463 HOSPITAL OUTPT CLINIC VISIT: HCPCS

## 2025-06-29 PROCEDURE — 76815 OB US LIMITED FETUS(S): CPT

## 2025-06-29 PROCEDURE — NC001 PR NO CHARGE: Performed by: OBSTETRICS & GYNECOLOGY

## 2025-06-30 ENCOUNTER — ROUTINE PRENATAL (OUTPATIENT)
Facility: HOSPITAL | Age: 35
End: 2025-06-30
Payer: COMMERCIAL

## 2025-06-30 VITALS
SYSTOLIC BLOOD PRESSURE: 108 MMHG | DIASTOLIC BLOOD PRESSURE: 60 MMHG | WEIGHT: 171.8 LBS | HEART RATE: 81 BPM | HEIGHT: 66 IN | BODY MASS INDEX: 27.61 KG/M2

## 2025-06-30 DIAGNOSIS — Z3A.37 37 WEEKS GESTATION OF PREGNANCY: Primary | ICD-10-CM

## 2025-06-30 DIAGNOSIS — O99.119 ANTIPHOSPHOLIPID SYNDROME DURING PREGNANCY (HCC): ICD-10-CM

## 2025-06-30 DIAGNOSIS — D68.61 ANTIPHOSPHOLIPID SYNDROME DURING PREGNANCY (HCC): ICD-10-CM

## 2025-06-30 PROCEDURE — 59025 FETAL NON-STRESS TEST: CPT | Performed by: OBSTETRICS & GYNECOLOGY

## 2025-06-30 NOTE — TELEPHONE ENCOUNTER
"REASON FOR CONVERSATION: Pregnancy Problem    SYMPTOMS: decreased fetal movement    OTHER HEALTH INFORMATION: Has tried several kick counts without movement. Last normal movement was last night.     PROTOCOL DISPOSITION: Go to [facility] (or PCP Triage)    CARE ADVICE PROVIDED: L&D eval    PRACTICE FOLLOW-UP: none      Reason for Disposition   [1] Pregnant 23 or more weeks AND [2] baby moving less today by kick count  (e.g., kick count < 5 in 1 hour or < 10 in 2 hours)    Answer Assessment - Initial Assessment Questions  1. FETAL MOVEMENT: \"Has the baby's movement decreased or changed significantly from normal?\" (e.g., yes, no; describe)        Tried several kick counts without movement. Has tried laying, food, fluids without results. Had busy day today and didn't note much movement. Last normal movement was last night    2. ARISTEO: \"What date are you expecting to deliver?\"         7/21/25    3. PREGNANCY: \"How many weeks pregnant are you?\"         36w6d    4. OTHER SYMPTOMS: \"Do you have any other symptoms?\" (e.g., abdominal pain, leaking fluid from vagina, vaginal bleeding, etc.)        Denies bleeding, leaking fluid, cramping, discharge.    Protocols used: Pregnancy - Decreased Fetal Movement-ADULT-AH    "

## 2025-06-30 NOTE — PROGRESS NOTES
Repeat Non-Stress Testing:    Patient verbalizes +FM. Pt denies ALL:               Leaking of fluid   Contractions   Vaginal bleeding  PT states she had decreased fetal movement yesterday and was seen in Labor and Delivery.  PT states when she got to the hospital, the baby started moving.  Pt states dr told her to keep appointment today for nonstress test.     Patient is performing daily kick counts. Patient has no questions or concerns.   NST strip reviewed by Dr. Atkins in-person.

## 2025-06-30 NOTE — PROGRESS NOTES
"L&D Triage Note - OB/GYN  Camilla Cuevas 35 y.o. female MRN: [id number]  Unit/Bed#:  TRIAGE 2 Encounter: [id number]    Patient is seen by [facility]    ASSESSMENT  Camilla Cuevas is a 35 y.o.  at 36w6d presenting with DFM. This pregnancy has been complicated by antiphospholipid syndrome, on Lovenox. She reports return of movement while in triage. NST reactive. HERMILO wnl. Safe for discharge with return precautions.    PLAN  #1. DFM:   HERMILO 11.56cm  NST reactive: baseline 130 bpm, moderate variability, 15x15 accels, no decels  [place]: uterine irritability    Discharge instructions  Patient instructed to call if experiencing worsening contractions, vaginal bleeding, loss of fluid or decreased fetal movement.  Will follow up with OBGYN on 25 w Dr. Ortiz.    D/w Dr. Harkins  ______________    SUBJECTIVE    ARISTEO: Estimated Date of Delivery: 25    HPI:  35 y.o.  36w6d presents with complaint of DFM all of today. She last felt good movement last night. Today she has been feeling more subtle movements but no strong kicks. She has not been meeting her kick counts. She has tried moving around & having sugary drinks but it hasn't helped.      Contractions: denies  Leakage of fluid: denies  Vaginal Bleeding: denies    Her obstetrical history is significant for SABx2    ROS:  Constitutional: Negative  Respiratory: Negative  Cardiovascular: Negative    Gastrointestinal: Negative    OBJECTIVE:    Vitals:  /61 (BP Location: Right arm)   Pulse 66   Temp 98.3 °F (36.8 °C) (Oral)   Resp 16   Ht 5' 6\" (1.676 m)   Wt 78 kg (172 lb)   LMP 10/14/2024   SpO2 98%   BMI 27.76 kg/m²   Body mass index is 27.76 kg/m².    Physical Exam  Vitals and nursing note reviewed.   Constitutional:       General: She is not in acute distress.     Appearance: She is well-developed.   HENT:      Head: Normocephalic and atraumatic.     Eyes:      Conjunctiva/sclera: Conjunctivae normal.       Cardiovascular:      Rate and " Rhythm: Normal rate and regular rhythm.      Heart sounds: No murmur heard.  Pulmonary:      Effort: Pulmonary effort is normal. No respiratory distress.      Breath sounds: Normal breath sounds.   Abdominal:      Palpations: Abdomen is soft.      Tenderness: There is no abdominal tenderness.      Comments: Gravid     Musculoskeletal:         General: No swelling.      Cervical back: Neck supple.     Skin:     General: Skin is warm and dry.      Capillary Refill: Capillary refill takes less than 2 seconds.     Neurological:      Mental Status: She is alert.     Psychiatric:         Mood and Affect: Mood normal.         SVE:   Deferred    FHT:   Baseline 130bpm, moderate variability, 15x15 accels, no decels    TOCO:    Irritability    IMAGING:      TAUS   HERMILO      - Q1 3.10 cm     - Q2 2.94 cm     - Q3 3.15 cm     - Q4 2.37 cm     - Total: 11.56 cm   Placenta: anterior   Presentation: vertex    Labs: No results found for this or any previous visit (from the past 24 hours).        Sherie Angel MD  6/29/2025  11:33 PM

## 2025-06-30 NOTE — PROCEDURES
Camilla Cuevas, a  at 36w6d with an ARISTEO of 2025, by Last Menstrual Period, was seen at [facility] DEL[facility] for the following procedure(s): $Procedure Type: HERMILO]               4 Quadrant HERMILO  HERMILO Q1 (cm): 2.4 cm  HERMILO Q2 (cm): 3.2 cm  HERMILO Q3 (cm): 3 cm  HERMILO Q4 (cm): 3.1 cm  HERMILO TOTAL (cm): 11.7 cm    Sherie Angel  PGY-1 OB/GYN  25  11:34 PM

## 2025-06-30 NOTE — TELEPHONE ENCOUNTER
"Regardinw6d pregnant /decreased fetal movement  ----- Message from Juno DELAROSA sent at 2025  9:50 PM EDT -----  \"I am 36w6d pregnant and I am experiencing decreased fetal movement. \"    "

## 2025-07-01 LAB
C TRACH RRNA SPEC QL NAA+PROBE: NEGATIVE
GP B STREP DNA SPEC QL NAA+PROBE: NEGATIVE
N GONORRHOEA RRNA SPEC QL NAA+PROBE: NEGATIVE

## 2025-07-02 PROBLEM — Z3A.37 37 WEEKS GESTATION OF PREGNANCY: Status: ACTIVE | Noted: 2025-01-24

## 2025-07-02 NOTE — ASSESSMENT & PLAN NOTE
Continue lovenox ppx  Interested in 39wk IOL to help time last lovenox injection for epidural during labor

## 2025-07-03 ENCOUNTER — ANCILLARY PROCEDURE (OUTPATIENT)
Facility: HOSPITAL | Age: 35
End: 2025-07-03
Attending: OBSTETRICS & GYNECOLOGY
Payer: COMMERCIAL

## 2025-07-03 ENCOUNTER — ULTRASOUND (OUTPATIENT)
Facility: HOSPITAL | Age: 35
End: 2025-07-03
Payer: COMMERCIAL

## 2025-07-03 ENCOUNTER — ROUTINE PRENATAL (OUTPATIENT)
Dept: OBGYN CLINIC | Facility: CLINIC | Age: 35
End: 2025-07-03

## 2025-07-03 VITALS — WEIGHT: 172 LBS | DIASTOLIC BLOOD PRESSURE: 70 MMHG | BODY MASS INDEX: 27.76 KG/M2 | SYSTOLIC BLOOD PRESSURE: 110 MMHG

## 2025-07-03 VITALS
HEART RATE: 74 BPM | WEIGHT: 173.2 LBS | HEIGHT: 66 IN | BODY MASS INDEX: 27.83 KG/M2 | SYSTOLIC BLOOD PRESSURE: 106 MMHG | DIASTOLIC BLOOD PRESSURE: 60 MMHG

## 2025-07-03 DIAGNOSIS — Z3A.37 37 WEEKS GESTATION OF PREGNANCY: ICD-10-CM

## 2025-07-03 DIAGNOSIS — D68.61 ANTIPHOSPHOLIPID SYNDROME DURING PREGNANCY (HCC): ICD-10-CM

## 2025-07-03 DIAGNOSIS — O99.119 ANTIPHOSPHOLIPID SYNDROME DURING PREGNANCY (HCC): Primary | ICD-10-CM

## 2025-07-03 DIAGNOSIS — O99.119 ANTIPHOSPHOLIPID SYNDROME DURING PREGNANCY (HCC): ICD-10-CM

## 2025-07-03 DIAGNOSIS — Z3A.37 37 WEEKS GESTATION OF PREGNANCY: Primary | ICD-10-CM

## 2025-07-03 DIAGNOSIS — D68.61 ANTIPHOSPHOLIPID SYNDROME DURING PREGNANCY (HCC): Primary | ICD-10-CM

## 2025-07-03 PROCEDURE — 59025 FETAL NON-STRESS TEST: CPT | Performed by: OBSTETRICS & GYNECOLOGY

## 2025-07-03 PROCEDURE — 76815 OB US LIMITED FETUS(S): CPT | Performed by: OBSTETRICS & GYNECOLOGY

## 2025-07-03 PROCEDURE — PNV: Performed by: OBSTETRICS & GYNECOLOGY

## 2025-07-03 NOTE — PROGRESS NOTES
Full Term Visit  Name: Camilla Cuevas      : 1990      MRN: [id number]  Encounter Provider: Christiana Ortiz MD  Encounter Date: 7/3/2025   Encounter department: [facility]    35 y.o.  at 37w3d presenting for routine OB visit at 37w3d.:  Assessment & Plan  Antiphospholipid syndrome during pregnancy (HCC)  Continue lovenox ppx  Interested in 39wk IOL to help time last lovenox injection for epidural during labor         37 weeks gestation of pregnancy  Reviewed GBS neg results  IOL already scheduled for            Reviewed labor precautions and fetal kick counts as well as pre-eclampsia warning signs.  Reviewed perineal massage for decreasing risk of perineal lacerations during delivery.  Advised to continue medications and return in 1 week.    History of Present Illness     She reports difficulty sleeping.  Denies uterine contractions.  Denies vaginal bleeding or leaking of fluid.  Reports adequate fetal movement of at least 10 movements in 2 hours once daily.       Current Outpatient Medications   Medication Instructions    cholecalciferol (VITAMIN D3) 400 Units, Daily    docusate sodium (COLACE) 100 mg, As needed    Doxylamine Succinate, Sleep, (UNISOM PO) Take by mouth    enoxaparin (LOVENOX) 40 mg, Subcutaneous, Daily    LORazepam (ATIVAN) 0.5 mg tablet as needed    polyethylene glycol (MiraLax Mix-In [place]) 17 g packet     Prenatal MV-Min-Fe Fum-FA-DHA (PRENATAL 1 PO) Take by mouth    psyllium (METAMUCIL) 58.6 % packet 1 packet, Daily     Objective   /70   Wt 78 kg (172 lb)   LMP 10/14/2024   BMI 27.76 kg/m²      BP: Blood Pressure: 110/70  Wt: 78 kg (172 lb); Body mass index is 27.76 kg/m².; TWG=15.4 kg (34 lb)  Fetal Heart Rate: 150;    SVE today: Cervical Dilation: Closed /Cervical Effacement: 0 /Fetal Station: Ballotable    Abdomen: ”soft”,”non tender”    Pregnancy Problems (from 24 to present)       Problem Noted Diagnosed Resolved    37 weeks  gestation of pregnancy 1/24/2025 by Beulah Harkins MD  No    Antiphospholipid syndrome during pregnancy (HCC) 1/24/2025 by Beulah Harkins MD  No          I have spent a total time of 20 minutes in caring for this patient on the day of the visit/encounter including Diagnostic results, Prognosis, Instructions for management, Impressions, Counseling / Coordination of care, Documenting in the medical record, and Obtaining or reviewing history  .

## 2025-07-03 NOTE — PROGRESS NOTES
Repeat Non-Stress Testing:    Patient verbalizes +FM. Pt denies ALL:               Leaking of fluid   Contractions   Vaginal bleeding   Decreased fetal movement    Patient is performing daily kick counts. Patient has no questions or concerns.   NST strip reviewed by Dr. Gomes in-person.

## 2025-07-07 ENCOUNTER — ROUTINE PRENATAL (OUTPATIENT)
Facility: HOSPITAL | Age: 35
End: 2025-07-07
Payer: COMMERCIAL

## 2025-07-07 VITALS
HEART RATE: 67 BPM | SYSTOLIC BLOOD PRESSURE: 100 MMHG | HEIGHT: 66 IN | DIASTOLIC BLOOD PRESSURE: 58 MMHG | BODY MASS INDEX: 28.57 KG/M2 | WEIGHT: 177.8 LBS

## 2025-07-07 DIAGNOSIS — O99.119 ANTIPHOSPHOLIPID SYNDROME DURING PREGNANCY (HCC): Primary | ICD-10-CM

## 2025-07-07 DIAGNOSIS — D68.61 ANTIPHOSPHOLIPID SYNDROME DURING PREGNANCY (HCC): Primary | ICD-10-CM

## 2025-07-07 DIAGNOSIS — Z3A.38 38 WEEKS GESTATION OF PREGNANCY: ICD-10-CM

## 2025-07-07 PROCEDURE — 59025 FETAL NON-STRESS TEST: CPT | Performed by: OBSTETRICS & GYNECOLOGY

## 2025-07-07 NOTE — Clinical Note
I just reviewed this nst after patient left.  Her diagnosis is antiphospholipid syndrome during pregnancy.  In the first MFM note there is some suggestion that GAVI and hematology did not agree on this diagnosis.  I cannot see any lab work to make my own decision.  She is 38 weeks and we need to decide whether she needs to be on Lovenox postpartum.  You are seeing her on 7/11/2025.  Can you review this with her better maybe she has her own test results or maybe she has the consult from hematology.  Usually the diagnosis of antiphospholipid syndrome based on miscarriages is with 3 prior miscarriages and or significantly elevated antiphospholipid antibodies in the moderate range  > 40 on 2 separate occasions 12 weeks apart.  She does not have confirmation of true antiphospholipid antibodies and she would not require Lovenox postpartum unless she had other indications.  Mag

## 2025-07-07 NOTE — PROGRESS NOTES
Repeat Non-Stress Testing:    Patient verbalizes +FM. Pt denies ALL:               Leaking of fluid   Contractions   Vaginal bleeding   Decreased fetal movement    Patient is performing daily kick counts. Patient has no questions or concerns.   NST strip reviewed by Dr. Hubbard in-person.

## 2025-07-08 PROBLEM — Z3A.38 38 WEEKS GESTATION OF PREGNANCY: Status: ACTIVE | Noted: 2025-01-24

## 2025-07-10 ENCOUNTER — HOSPITAL ENCOUNTER (INPATIENT)
Facility: HOSPITAL | Age: 35
LOS: 3 days | Discharge: HOME | End: 2025-07-13
Attending: OBSTETRICS & GYNECOLOGY | Admitting: OBSTETRICS & GYNECOLOGY
Payer: COMMERCIAL

## 2025-07-10 ENCOUNTER — ULTRASOUND (OUTPATIENT)
Facility: HOSPITAL | Age: 35
End: 2025-07-10
Payer: COMMERCIAL

## 2025-07-10 ENCOUNTER — NURSE TRIAGE (OUTPATIENT)
Dept: OTHER | Facility: OTHER | Age: 35
End: 2025-07-10

## 2025-07-10 ENCOUNTER — ANCILLARY PROCEDURE (OUTPATIENT)
Facility: HOSPITAL | Age: 35
End: 2025-07-10
Attending: OBSTETRICS & GYNECOLOGY
Payer: COMMERCIAL

## 2025-07-10 VITALS
DIASTOLIC BLOOD PRESSURE: 72 MMHG | HEIGHT: 66 IN | SYSTOLIC BLOOD PRESSURE: 110 MMHG | HEART RATE: 67 BPM | BODY MASS INDEX: 28.38 KG/M2 | WEIGHT: 176.6 LBS

## 2025-07-10 DIAGNOSIS — Z98.891 S/P PRIMARY LOW TRANSVERSE C-SECTION: Primary | ICD-10-CM

## 2025-07-10 DIAGNOSIS — Z3A.38 38 WEEKS GESTATION OF PREGNANCY: ICD-10-CM

## 2025-07-10 DIAGNOSIS — D68.61 ANTIPHOSPHOLIPID SYNDROME DURING PREGNANCY (HCC): ICD-10-CM

## 2025-07-10 DIAGNOSIS — O99.119 ANTIPHOSPHOLIPID SYNDROME DURING PREGNANCY (HCC): ICD-10-CM

## 2025-07-10 DIAGNOSIS — O40.3XX0 POLYHYDRAMNIOS AFFECTING PREGNANCY IN THIRD TRIMESTER: Primary | ICD-10-CM

## 2025-07-10 PROBLEM — Z3A.39 39 WEEKS GESTATION OF PREGNANCY: Status: ACTIVE | Noted: 2025-01-24

## 2025-07-10 PROBLEM — O40.9XX0 POLYHYDRAMNIOS: Status: ACTIVE | Noted: 2025-07-10

## 2025-07-10 LAB
ABO GROUP BLD: NORMAL
BASOPHILS # BLD AUTO: 0.02 THOUSANDS/ÂΜL (ref 0–0.1)
BASOPHILS NFR BLD AUTO: 0 % (ref 0–1)
BLD GP AB SCN SERPL QL: NEGATIVE
EOSINOPHIL # BLD AUTO: 0.04 THOUSAND/ÂΜL (ref 0–0.61)
EOSINOPHIL NFR BLD AUTO: 0 % (ref 0–6)
ERYTHROCYTE [DISTWIDTH] IN BLOOD BY AUTOMATED COUNT: 13.2 % (ref 11.6–15.1)
HCT VFR BLD AUTO: 33.9 % (ref 34.8–46.1)
HGB BLD-MCNC: 11.1 G/DL (ref 11.5–15.4)
IMM GRANULOCYTES # BLD AUTO: 0.24 THOUSAND/UL (ref 0–0.2)
IMM GRANULOCYTES NFR BLD AUTO: 2 % (ref 0–2)
LYMPHOCYTES # BLD AUTO: 2.52 THOUSANDS/ÂΜL (ref 0.6–4.47)
LYMPHOCYTES NFR BLD AUTO: 22 % (ref 14–44)
MCH RBC QN AUTO: 28.2 PG (ref 26.8–34.3)
MCHC RBC AUTO-ENTMCNC: 32.7 G/DL (ref 31.4–37.4)
MCV RBC AUTO: 86 FL (ref 82–98)
MONOCYTES # BLD AUTO: 0.67 THOUSAND/ÂΜL (ref 0.17–1.22)
MONOCYTES NFR BLD AUTO: 6 % (ref 4–12)
NEUTROPHILS # BLD AUTO: 7.83 THOUSANDS/ÂΜL (ref 1.85–7.62)
NEUTS SEG NFR BLD AUTO: 70 % (ref 43–75)
NRBC BLD AUTO-RTO: 0 /100 WBCS
PLATELET # BLD AUTO: 322 THOUSANDS/UL (ref 149–390)
PMV BLD AUTO: 9.5 FL (ref 8.9–12.7)
RBC # BLD AUTO: 3.93 MILLION/UL (ref 3.81–5.12)
RH BLD: POSITIVE
SPECIMEN EXPIRATION DATE: NORMAL
WBC # BLD AUTO: 11.32 THOUSAND/UL (ref 4.31–10.16)

## 2025-07-10 PROCEDURE — 86780 TREPONEMA PALLIDUM: CPT

## 2025-07-10 PROCEDURE — G0463 HOSPITAL OUTPT CLINIC VISIT: HCPCS

## 2025-07-10 PROCEDURE — 86850 RBC ANTIBODY SCREEN: CPT

## 2025-07-10 PROCEDURE — 99213 OFFICE O/P EST LOW 20 MIN: CPT | Performed by: PHYSICIAN ASSISTANT

## 2025-07-10 PROCEDURE — 76815 OB US LIMITED FETUS(S): CPT | Performed by: STUDENT IN AN ORGANIZED HEALTH CARE EDUCATION/TRAINING PROGRAM

## 2025-07-10 PROCEDURE — 99213 OFFICE O/P EST LOW 20 MIN: CPT

## 2025-07-10 PROCEDURE — 86901 BLOOD TYPING SEROLOGIC RH(D): CPT

## 2025-07-10 PROCEDURE — 4A1HXCZ MONITORING OF PRODUCTS OF CONCEPTION, CARDIAC RATE, EXTERNAL APPROACH: ICD-10-PCS | Performed by: OBSTETRICS & GYNECOLOGY

## 2025-07-10 PROCEDURE — NC001 PR NO CHARGE: Performed by: OBSTETRICS & GYNECOLOGY

## 2025-07-10 PROCEDURE — 59025 FETAL NON-STRESS TEST: CPT | Performed by: PHYSICIAN ASSISTANT

## 2025-07-10 PROCEDURE — 86900 BLOOD TYPING SEROLOGIC ABO: CPT

## 2025-07-10 PROCEDURE — 85025 COMPLETE CBC W/AUTO DIFF WBC: CPT

## 2025-07-10 RX ORDER — CALCIUM CARBONATE 500 MG/1
1000 TABLET, CHEWABLE ORAL 2 TIMES DAILY PRN
Status: DISCONTINUED | OUTPATIENT
Start: 2025-07-10 | End: 2025-07-12

## 2025-07-10 RX ORDER — ENOXAPARIN SODIUM 100 MG/ML
40 INJECTION SUBCUTANEOUS DAILY
Status: DISCONTINUED | OUTPATIENT
Start: 2025-07-11 | End: 2025-07-11

## 2025-07-10 RX ORDER — SODIUM CHLORIDE, SODIUM LACTATE, POTASSIUM CHLORIDE, CALCIUM CHLORIDE 600; 310; 30; 20 MG/100ML; MG/100ML; MG/100ML; MG/100ML
125 INJECTION, SOLUTION INTRAVENOUS CONTINUOUS
Status: DISCONTINUED | OUTPATIENT
Start: 2025-07-10 | End: 2025-07-12

## 2025-07-10 RX ORDER — ACETAMINOPHEN 325 MG/1
975 TABLET ORAL EVERY 6 HOURS PRN
Status: DISCONTINUED | OUTPATIENT
Start: 2025-07-10 | End: 2025-07-12

## 2025-07-10 RX ORDER — BUPIVACAINE HYDROCHLORIDE 2.5 MG/ML
30 INJECTION, SOLUTION EPIDURAL; INFILTRATION; INTRACAUDAL; PERINEURAL ONCE AS NEEDED
Status: DISCONTINUED | OUTPATIENT
Start: 2025-07-10 | End: 2025-07-12

## 2025-07-10 RX ORDER — ONDANSETRON 2 MG/ML
4 INJECTION INTRAMUSCULAR; INTRAVENOUS EVERY 6 HOURS PRN
Status: DISCONTINUED | OUTPATIENT
Start: 2025-07-10 | End: 2025-07-12

## 2025-07-10 RX ADMIN — SODIUM CHLORIDE, SODIUM LACTATE, POTASSIUM CHLORIDE, AND CALCIUM CHLORIDE 125 ML/HR: .6; .31; .03; .02 INJECTION, SOLUTION INTRAVENOUS at 22:52

## 2025-07-10 NOTE — ASSESSMENT & PLAN NOTE
NST was reactive.  Contractions were noted, though patient denies feeling any at this time.  She reports active fetal movement.

## 2025-07-10 NOTE — PROGRESS NOTES
Repeat Non-Stress Testing:    Patient verbalizes +FM. Pt denies ALL:               Leaking of fluid   Regular Contractions   Vaginal bleeding   Decreased fetal movement    Patient is performing daily kick counts. Patient has no questions or concerns.   NST strip reviewed by CHIQUI Valle in-person.

## 2025-07-10 NOTE — PROGRESS NOTES
"Follow Up - Maternal Fetal Medicine  Name: Camilla Cuevas      : 1990      MRN: [id number]  Encounter Provider: Josette Krueger MD  Encounter Date: 7/10/2025   Encounter department: [facility]    :  Assessment & Plan  Polyhydramnios affecting pregnancy in third trimester  We reviewed today's finding of mild polyhydramnios.  HERMILO was 24.6 cm with MVP 9.8 cm.  We discussed that most cases of mild polyhydramnios are idiopathic.  A common pathologic cause is maternal diabetes mellitus; Camilla passed her glucose test.  Fetal anomalies can also be associated with the presence of polyhydramnios, some of which are associated with genetic syndromes.  Her NIPT was low risk. Other pathologic causes include congenital infection and alloimmunization. The likelihood of an underlying fetal abnormality is significantly higher with greater degrees of polyhydramnios.  When no etiology is identified, it is termed  \"idiopathic.\"  Some conditions may become evident with advancing gestation or postnatally.  Idiopathic polyhydramnios accounts for 60-70% of cases and occurs in nearly 1% of pregnancies. There is an increased chance of  labor, placental abruption, and malpresentation with polyhydramnios.     Given mild, idiopathic polyhydramnios, no antepartum fetal surveillance or early delivery are indicated.  She is scheduled for induction on the evening of .  She will return for an additional nonstress test that morning.         Antiphospholipid syndrome during pregnancy (HCC)  NST was reactive.  Contractions were noted, though patient denies feeling any at this time.  She reports active fetal movement.         38 weeks gestation of pregnancy             History of Present Illness     Camilla Cuevas is a 35 y.o. female  at 38w3d who presents for NST and HERMILO.     Objective   /72 (BP Location: Right arm, Patient Position: Sitting, Cuff Size: Standard)   Pulse 67   " "Ht 5' 6\" (1.676 m)   Wt 80.1 kg (176 lb 9.6 oz)   LMP 10/14/2024   BMI 28.50 kg/m²      Patient's last menstrual period was 10/14/2024.  Estimated Delivery Date: 7/21/2025, by Last Menstrual Period    Please refer to \"Imaging\" for ultrasound report from today's visit. NST is found under the pregnancy episode.     Evaluation and Management:   The total time dedicated to this patient encounter was 10 minutes (3 minutes preparation, 4 minutes face-to-face, and 3 minutes documenting\"  "

## 2025-07-11 ENCOUNTER — ANESTHESIA EVENT (INPATIENT)
Dept: LABOR AND DELIVERY | Facility: HOSPITAL | Age: 35
End: 2025-07-11
Payer: COMMERCIAL

## 2025-07-11 ENCOUNTER — ANESTHESIA (INPATIENT)
Dept: LABOR AND DELIVERY | Facility: HOSPITAL | Age: 35
End: 2025-07-11
Payer: COMMERCIAL

## 2025-07-11 PROBLEM — Z98.891 S/P PRIMARY LOW TRANSVERSE C-SECTION: Status: ACTIVE | Noted: 2025-01-24

## 2025-07-11 LAB
BASE EXCESS BLDCOA CALC-SCNC: -7.1 MMOL/L (ref 3–11)
BASE EXCESS BLDCOV CALC-SCNC: -4.5 MMOL/L (ref 1–9)
HCO3 BLDCOA-SCNC: 20.5 MMOL/L (ref 17.3–27.3)
HCO3 BLDCOV-SCNC: 20.5 MMOL/L (ref 12.2–28.6)
HOLD SPECIMEN: NORMAL
O2 CT VFR BLDCOA CALC: 6.3 ML/DL
OXYHGB MFR BLDCOA: 29.9 %
OXYHGB MFR BLDCOV: 48.2 %
PCO2 BLDCOA: 49.5 MM[HG] (ref 30–60)
PCO2 BLDCOV: 37.7 MM HG (ref 27–43)
PH BLDCOA: 7.24 [PH] (ref 7.23–7.43)
PH BLDCOV: 7.35 [PH] (ref 7.19–7.49)
PO2 BLDCOA: 16.1 MM HG (ref 5–25)
PO2 BLDCOV: 20.1 MM HG (ref 15–45)
SAO2 % BLDCOV: 9.8 ML/DL
TREPONEMA PALLIDUM IGG+IGM AB [PRESENCE] IN SERUM OR PLASMA BY IMMUNOASSAY: NORMAL

## 2025-07-11 PROCEDURE — 59510 CESAREAN DELIVERY: CPT | Performed by: OBSTETRICS & GYNECOLOGY

## 2025-07-11 PROCEDURE — 82805 BLOOD GASES W/O2 SATURATION: CPT | Performed by: OBSTETRICS & GYNECOLOGY

## 2025-07-11 RX ORDER — ONDANSETRON 2 MG/ML
4 INJECTION INTRAMUSCULAR; INTRAVENOUS ONCE AS NEEDED
Status: DISCONTINUED | OUTPATIENT
Start: 2025-07-11 | End: 2025-07-13 | Stop reason: HOSPADM

## 2025-07-11 RX ORDER — LABETALOL HYDROCHLORIDE 5 MG/ML
5 INJECTION, SOLUTION INTRAVENOUS
Status: DISCONTINUED | OUTPATIENT
Start: 2025-07-11 | End: 2025-07-13 | Stop reason: HOSPADM

## 2025-07-11 RX ORDER — DEXAMETHASONE SODIUM PHOSPHATE 10 MG/ML
INJECTION, SOLUTION INTRAMUSCULAR; INTRAVENOUS AS NEEDED
Status: DISCONTINUED | OUTPATIENT
Start: 2025-07-11 | End: 2025-07-11

## 2025-07-11 RX ORDER — ALBUTEROL SULFATE 0.83 MG/ML
2.5 SOLUTION RESPIRATORY (INHALATION) ONCE AS NEEDED
Status: DISCONTINUED | OUTPATIENT
Start: 2025-07-11 | End: 2025-07-13 | Stop reason: HOSPADM

## 2025-07-11 RX ORDER — PROMETHAZINE HYDROCHLORIDE 25 MG/ML
12.5 INJECTION, SOLUTION INTRAMUSCULAR; INTRAVENOUS ONCE AS NEEDED
Status: DISCONTINUED | OUTPATIENT
Start: 2025-07-11 | End: 2025-07-13 | Stop reason: HOSPADM

## 2025-07-11 RX ORDER — PHENYLEPHRINE HCL IN 0.9% NACL 1 MG/10 ML
100 SYRINGE (ML) INTRAVENOUS ONCE AS NEEDED
Status: DISCONTINUED | OUTPATIENT
Start: 2025-07-11 | End: 2025-07-12

## 2025-07-11 RX ORDER — OXYTOCIN/0.9 % SODIUM CHLORIDE 30/500 ML
62.5 PLASTIC BAG, INJECTION (ML) INTRAVENOUS ONCE
Status: COMPLETED | OUTPATIENT
Start: 2025-07-11 | End: 2025-07-11

## 2025-07-11 RX ORDER — FENTANYL CITRATE/PF 50 MCG/ML
25 SYRINGE (ML) INJECTION
Status: DISCONTINUED | OUTPATIENT
Start: 2025-07-11 | End: 2025-07-13 | Stop reason: HOSPADM

## 2025-07-11 RX ORDER — LIDOCAINE HYDROCHLORIDE AND EPINEPHRINE 15; 5 MG/ML; UG/ML
INJECTION, SOLUTION EPIDURAL
Status: COMPLETED | OUTPATIENT
Start: 2025-07-11 | End: 2025-07-11

## 2025-07-11 RX ORDER — MORPHINE SULFATE 0.5 MG/ML
INJECTION, SOLUTION EPIDURAL; INTRATHECAL; INTRAVENOUS AS NEEDED
Status: DISCONTINUED | OUTPATIENT
Start: 2025-07-11 | End: 2025-07-11

## 2025-07-11 RX ORDER — HYDROMORPHONE HCL/PF 1 MG/ML
0.5 SYRINGE (ML) INJECTION
Status: DISCONTINUED | OUTPATIENT
Start: 2025-07-11 | End: 2025-07-13 | Stop reason: HOSPADM

## 2025-07-11 RX ORDER — OXYTOCIN/0.9 % SODIUM CHLORIDE 30/500 ML
1-30 PLASTIC BAG, INJECTION (ML) INTRAVENOUS
Status: DISCONTINUED | OUTPATIENT
Start: 2025-07-11 | End: 2025-07-11

## 2025-07-11 RX ORDER — DIPHENHYDRAMINE HYDROCHLORIDE 50 MG/ML
25 INJECTION, SOLUTION INTRAMUSCULAR; INTRAVENOUS EVERY 6 HOURS PRN
Status: DISCONTINUED | OUTPATIENT
Start: 2025-07-11 | End: 2025-07-12

## 2025-07-11 RX ORDER — CEFAZOLIN SODIUM 2 G/50ML
2000 SOLUTION INTRAVENOUS ONCE
Status: COMPLETED | OUTPATIENT
Start: 2025-07-11 | End: 2025-07-11

## 2025-07-11 RX ORDER — SODIUM CHLORIDE, SODIUM LACTATE, POTASSIUM CHLORIDE, CALCIUM CHLORIDE 600; 310; 30; 20 MG/100ML; MG/100ML; MG/100ML; MG/100ML
INJECTION, SOLUTION INTRAVENOUS CONTINUOUS PRN
Status: DISCONTINUED | OUTPATIENT
Start: 2025-07-11 | End: 2025-07-11

## 2025-07-11 RX ORDER — ONDANSETRON 2 MG/ML
INJECTION INTRAMUSCULAR; INTRAVENOUS AS NEEDED
Status: DISCONTINUED | OUTPATIENT
Start: 2025-07-11 | End: 2025-07-11

## 2025-07-11 RX ORDER — ENOXAPARIN SODIUM 100 MG/ML
40 INJECTION SUBCUTANEOUS
Status: DISCONTINUED | OUTPATIENT
Start: 2025-07-12 | End: 2025-07-13

## 2025-07-11 RX ORDER — BUPIVACAINE HYDROCHLORIDE 2.5 MG/ML
INJECTION, SOLUTION EPIDURAL; INFILTRATION; INTRACAUDAL; PERINEURAL AS NEEDED
Status: DISCONTINUED | OUTPATIENT
Start: 2025-07-11 | End: 2025-07-11

## 2025-07-11 RX ORDER — TRANEXAMIC ACID 10 MG/ML
INJECTION, SOLUTION INTRAVENOUS AS NEEDED
Status: DISCONTINUED | OUTPATIENT
Start: 2025-07-11 | End: 2025-07-11

## 2025-07-11 RX ORDER — LIDOCAINE HYDROCHLORIDE AND EPINEPHRINE 20; 5 MG/ML; UG/ML
INJECTION, SOLUTION EPIDURAL; INFILTRATION; INTRACAUDAL; PERINEURAL AS NEEDED
Status: DISCONTINUED | OUTPATIENT
Start: 2025-07-11 | End: 2025-07-11

## 2025-07-11 RX ORDER — FENTANYL CITRATE 50 UG/ML
INJECTION, SOLUTION INTRAMUSCULAR; INTRAVENOUS AS NEEDED
Status: DISCONTINUED | OUTPATIENT
Start: 2025-07-11 | End: 2025-07-11

## 2025-07-11 RX ORDER — NALBUPHINE HYDROCHLORIDE 10 MG/ML
5 INJECTION INTRAMUSCULAR; INTRAVENOUS; SUBCUTANEOUS
Status: DISCONTINUED | OUTPATIENT
Start: 2025-07-11 | End: 2025-07-12

## 2025-07-11 RX ADMIN — LIDOCAINE HYDROCHLORIDE AND EPINEPHRINE 10 ML: 20; 5 INJECTION, SOLUTION EPIDURAL; INFILTRATION; INTRACAUDAL; PERINEURAL at 19:49

## 2025-07-11 RX ADMIN — DEXAMETHASONE SODIUM PHOSPHATE 10 MG: 10 INJECTION, SOLUTION INTRAMUSCULAR; INTRAVENOUS at 20:26

## 2025-07-11 RX ADMIN — MORPHINE SULFATE 3 MG: 0.5 INJECTION, SOLUTION EPIDURAL; INTRATHECAL; INTRAVENOUS at 20:46

## 2025-07-11 RX ADMIN — BUPIVACAINE HYDROCHLORIDE 5 ML: 2.5 INJECTION, SOLUTION EPIDURAL; INFILTRATION; INTRACAUDAL at 18:10

## 2025-07-11 RX ADMIN — Medication 62.5 MILLI-UNITS/MIN: at 22:13

## 2025-07-11 RX ADMIN — CEFAZOLIN SODIUM 2000 MG: 2 SOLUTION INTRAVENOUS at 20:00

## 2025-07-11 RX ADMIN — SODIUM CHLORIDE, SODIUM LACTATE, POTASSIUM CHLORIDE, AND CALCIUM CHLORIDE 125 ML/HR: .6; .31; .03; .02 INJECTION, SOLUTION INTRAVENOUS at 14:01

## 2025-07-11 RX ADMIN — ROPIVACAINE HYDROCHLORIDE: 2 INJECTION, SOLUTION EPIDURAL; INFILTRATION at 14:46

## 2025-07-11 RX ADMIN — ROPIVACAINE HYDROCHLORIDE: 2 INJECTION, SOLUTION EPIDURAL; INFILTRATION at 01:10

## 2025-07-11 RX ADMIN — ONDANSETRON 4 MG: 2 INJECTION INTRAMUSCULAR; INTRAVENOUS at 19:10

## 2025-07-11 RX ADMIN — FENTANYL CITRATE 100 MCG: 50 INJECTION INTRAMUSCULAR; INTRAVENOUS at 13:34

## 2025-07-11 RX ADMIN — SODIUM CHLORIDE, SODIUM LACTATE, POTASSIUM CHLORIDE, AND CALCIUM CHLORIDE: .6; .31; .03; .02 INJECTION, SOLUTION INTRAVENOUS at 19:53

## 2025-07-11 RX ADMIN — BUPIVACAINE HYDROCHLORIDE 5 ML: 2.5 INJECTION, SOLUTION EPIDURAL; INFILTRATION; INTRACAUDAL at 18:08

## 2025-07-11 RX ADMIN — FENTANYL CITRATE 100 MCG: 50 INJECTION INTRAMUSCULAR; INTRAVENOUS at 18:08

## 2025-07-11 RX ADMIN — BUPIVACAINE HYDROCHLORIDE 10 ML: 2.5 INJECTION, SOLUTION EPIDURAL; INFILTRATION; INTRACAUDAL at 13:33

## 2025-07-11 RX ADMIN — SODIUM CHLORIDE, SODIUM LACTATE, POTASSIUM CHLORIDE, AND CALCIUM CHLORIDE 125 ML/HR: .6; .31; .03; .02 INJECTION, SOLUTION INTRAVENOUS at 00:43

## 2025-07-11 RX ADMIN — LIDOCAINE HYDROCHLORIDE AND EPINEPHRINE 5 ML: 15; 5 INJECTION, SOLUTION EPIDURAL at 01:04

## 2025-07-11 RX ADMIN — TRANEXAMIC ACID 1000 MG: 10 INJECTION, SOLUTION INTRAVENOUS at 20:23

## 2025-07-11 RX ADMIN — SODIUM CHLORIDE, SODIUM LACTATE, POTASSIUM CHLORIDE, AND CALCIUM CHLORIDE 125 ML/HR: .6; .31; .03; .02 INJECTION, SOLUTION INTRAVENOUS at 19:45

## 2025-07-11 RX ADMIN — Medication 2 MILLI-UNITS/MIN: at 08:07

## 2025-07-11 RX ADMIN — SODIUM CHLORIDE, SODIUM LACTATE, POTASSIUM CHLORIDE, AND CALCIUM CHLORIDE 125 ML/HR: .6; .31; .03; .02 INJECTION, SOLUTION INTRAVENOUS at 21:12

## 2025-07-11 RX ADMIN — LIDOCAINE HYDROCHLORIDE AND EPINEPHRINE 5 ML: 20; 5 INJECTION, SOLUTION EPIDURAL; INFILTRATION; INTRACAUDAL; PERINEURAL at 20:04

## 2025-07-11 RX ADMIN — LIDOCAINE HYDROCHLORIDE AND EPINEPHRINE 5 ML: 20; 5 INJECTION, SOLUTION EPIDURAL; INFILTRATION; INTRACAUDAL; PERINEURAL at 19:54

## 2025-07-11 RX ADMIN — ONDANSETRON 4 MG: 2 INJECTION, SOLUTION INTRAMUSCULAR; INTRAVENOUS at 20:26

## 2025-07-11 RX ADMIN — SODIUM CHLORIDE, SODIUM LACTATE, POTASSIUM CHLORIDE, AND CALCIUM CHLORIDE 125 ML/HR: .6; .31; .03; .02 INJECTION, SOLUTION INTRAVENOUS at 08:08

## 2025-07-11 RX ADMIN — ROPIVACAINE HYDROCHLORIDE: 2 INJECTION, SOLUTION EPIDURAL; INFILTRATION at 08:09

## 2025-07-11 RX ADMIN — AZITHROMYCIN 500 MG: 500 INJECTION, POWDER, LYOPHILIZED, FOR SOLUTION INTRAVENOUS at 20:00

## 2025-07-11 NOTE — OB LABOR/OXYTOCIN SAFETY PROGRESS
Labor Progress Note - Camilla Cuevas 35 y.o. female MRN: [id number]    Unit/Bed#: -01 Encounter: [id number]       Contraction Frequency (minutes): 3-4  Contraction Intensity: Mild  Uterine Activity Characteristics: Regular  Cervical Dilation: 3        Cervical Effacement: 80  Fetal Station: 0  Baseline Rate (FHR): 135 bpm  Fetal Heart Rate (FHT): 155 BPM  FHR Category: 1               Vital Signs:   Vitals:    07/10/25 2312   BP: 113/61   Pulse: 65   Resp: 18   Temp: 98.9 °F (37.2 °C)   SpO2:      FHT category 1. SVE as above. Will continue expectant management.     Dr. Zafar freedman.    Yessica Fernandez MD 7/11/2025 12:20 AM

## 2025-07-11 NOTE — OB LABOR/OXYTOCIN SAFETY PROGRESS
Oxytocin Safety Progress Check Note - Camilla Cuevas 35 y.o. female MRN: [id number]    Unit/Bed#: -01 Encounter: [id number]    Dose (ani-units/min) Oxytocin: 8 ani-units/min  Contraction Frequency (minutes): 2  Contraction Intensity: Strong  Uterine Activity Characteristics: Regular  Cervical Dilation: 10        Cervical Effacement: 100  Fetal Station: 2  Baseline Rate (FHR): 145 bpm  Fetal Heart Rate (FHT): 144 BPM  FHR Category: I                Vital Signs:   Vitals:    25 1902   BP: 119/59   Pulse: 67   Resp:    Temp:    SpO2:        Notes/comments:   Complete and pushing since , with 2 breaks due to patient discomfort.   Good maternal pushing effort.   Minimal descent of vertex.   Discussed  for Arrest of descent.   Due to station, and concern for LGA, do not recommend operative delivery due to risk of shoulder dystocia, AC 99% at 36wks.   All questions answered.   Anesthesia team notified.   Azithromycin and ancef ordered.       Tatyana Ram DO 2025 7:28 PM

## 2025-07-11 NOTE — PLAN OF CARE
Problem: PAIN - ADULT  Goal: Verbalizes/displays adequate comfort level or baseline comfort level  Description: Interventions:  - Encourage patient to monitor pain and request assistance  - Assess pain using appropriate pain scale  - Administer analgesics as ordered based on type and severity of pain and evaluate response  - Implement non-pharmacological measures as appropriate and evaluate response  - Consider cultural and social influences on pain and pain management  - Notify physician/advanced practitioner if interventions unsuccessful or patient reports new pain  - Educate patient/family on pain management process including their role and importance of  reporting pain   - Provide non-pharmacologic/complimentary pain relief interventions  Outcome: Progressing     Problem: INFECTION - ADULT  Goal: Absence or prevention of progression during hospitalization  Description: INTERVENTIONS:  - Assess and monitor for signs and symptoms of infection  - Monitor lab/diagnostic results  - Monitor all insertion sites, i.e. indwelling lines, tubes, and drains  - Monitor endotracheal if appropriate and nasal secretions for changes in amount and color  - [place] appropriate cooling/warming therapies per order  - Administer medications as ordered  - Instruct and encourage patient and family to use good hand hygiene technique  - Identify and instruct in appropriate isolation precautions for identified infection/condition  Outcome: Progressing  Goal: Absence of fever/infection during neutropenic period  Description: INTERVENTIONS:  - Monitor WBC  - Perform strict hand hygiene  - Limit to healthy visitors only  - No plants, dried, fresh or silk flowers with hernandez in patient room  Outcome: Progressing     Problem: SAFETY ADULT  Goal: Patient will remain free of falls  Description: INTERVENTIONS:  - Educate patient/family on patient safety including physical limitations  - Instruct patient to call for assistance with activity   -  Consider consulting OT/PT to assist with strengthening/mobility based on AM PAC & JH-HLM score  - Consult OT/PT to assist with strengthening/mobility   - Keep Call bell within reach  - Keep bed low and locked with side rails adjusted as appropriate  - Keep care items and personal belongings within reach  - Initiate and maintain comfort rounds  - Make Fall Risk Sign visible to staff  - Offer Toileting every  Hours, in advance of need  - Initiate/Maintain alarm  - Obtain necessary fall risk management equipment:   - Apply yellow socks and bracelet for high fall risk patients  - Consider moving patient to room near nurses station  Outcome: Progressing  Goal: Maintain or return to baseline ADL function  Description: INTERVENTIONS:  -  Assess patient's ability to carry out ADLs; assess patient's baseline for ADL function and identify physical deficits which impact ability to perform ADLs (bathing, care of mouth/teeth, toileting, grooming, dressing, etc.)  - Assess/evaluate cause of self-care deficits   - Assess range of motion  - Assess patient's mobility; develop plan if impaired  - Assess patient's need for assistive devices and provide as appropriate  - Encourage maximum independence but intervene and supervise when necessary  - Involve family in performance of ADLs  - Assess for home care needs following discharge   - Consider OT consult to assist with ADL evaluation and planning for discharge  - Provide patient education as appropriate  - Monitor functional capacity and physical performance, use of AM PAC & JH-HLM   - Monitor gait, balance and fatigue with ambulation    Outcome: Progressing  Goal: Maintains/Returns to pre admission functional level  Description: INTERVENTIONS:  - Perform AM-PAC 6 Click Basic Mobility/ Daily Activity assessment daily.  - Set and communicate daily mobility goal to care team and patient/family/caregiver.   - Collaborate with rehabilitation services on mobility goals if consulted  -  Perform Range of Motion  times a day.  - Reposition patient every  hours.  - Dangle patient  times a day  - Stand patient  times a day  - Ambulate patient  times a day  - Out of bed to chair  times a day   - Out of bed for meals  times a day  - Out of bed for toileting  - Record patient progress and toleration of activity level   Outcome: Progressing     Problem: DISCHARGE PLANNING  Goal: Discharge to home or other facility with appropriate resources  Description: INTERVENTIONS:  - Identify barriers to discharge w/patient and caregiver  - Arrange for needed discharge resources and transportation as appropriate  - Identify discharge learning needs (meds, wound care, etc.)  - Arrange for interpretive services to assist at discharge as needed  - Refer to Case Management Department for coordinating discharge planning if the patient needs post-hospital services based on physician/advanced practitioner order or complex needs related to functional status, cognitive ability, or social support system  Outcome: Progressing     Problem: Knowledge Deficit  Goal: Patient/family/caregiver demonstrates understanding of disease process, treatment plan, medications, and discharge instructions  Description: Complete learning assessment and assess knowledge base.  Interventions:  - Provide teaching at level of understanding  - Provide teaching via preferred learning methods  Outcome: Progressing  Goal: Verbalizes understanding of labor plan  Description: Assess patient/family/caregiver's baseline knowledge level and ability to understand information.  Provide education via patient/family/caregiver's preferred learning method at appropriate level of understanding.     1. Provide teaching at level of understanding.  2. Provide teaching via preferred learning method(s).  Outcome: Progressing     Problem: Labor & Delivery  Goal: Manages discomfort  Description: Assess and monitor for signs and symptoms of discomfort.  Assess patient's  pain level regularly and per hospital policy.  Administer medications as ordered. Support use of nonpharmacological methods to help control pain such as distraction, imagery, relaxation, and application of heat and cold.  Collaborate with interdisciplinary team and patient to determine appropriate pain management plan.    1. Include patient in decisions related to comfort.  2. Offer non-pharmacological pain management interventions.  3. Report ineffective pain management to physician.  Outcome: Progressing  Goal: Patient vital signs are stable  Description: 1. Assess vital signs - vaginal delivery.  Outcome: Progressing

## 2025-07-11 NOTE — OB LABOR/OXYTOCIN SAFETY PROGRESS
Oxytocin Safety Progress Check Note - Camilla Cuevas 35 y.o. female MRN: [id number]    Unit/Bed#: -01 Encounter: [id number]    Dose (ani-units/min) Oxytocin: 6 ani-units/min  Contraction Frequency (minutes): 2-3  Contraction Intensity: Moderate  Uterine Activity Characteristics: Regular  Cervical Dilation: 5        Cervical Effacement: 100  Fetal Station: 0  Baseline Rate (FHR): 145 bpm  Fetal Heart Rate (FHT): 150 BPM  FHR Category: I               Vital Signs:   Vitals:    07/11/25 1219   BP: 109/58   Pulse: 69   Resp:    Temp:    SpO2:        Notes/comments:   Patient feeling more pressure, making good change  Continue with labor course     Joleen Paul DO 7/11/2025 12:57 PM

## 2025-07-11 NOTE — OB LABOR/OXYTOCIN SAFETY PROGRESS
Oxytocin Safety Progress Check Note - Camilla Cuevas 35 y.o. female MRN: [id number]    Unit/Bed#: -01 Encounter: [id number]    Dose (ani-units/min) Oxytocin: 6 ani-units/min  Contraction Frequency (minutes): 5  Contraction Intensity: Moderate  Uterine Activity Characteristics: Irregular  Cervical Dilation: 5        Cervical Effacement: 90  Fetal Station: -1  Baseline Rate (FHR): 145 bpm  Fetal Heart Rate (FHT): 143 BPM  FHR Category: I               Vital Signs:   Vitals:    07/11/25 1020   BP: 107/58   Pulse: 68   Resp:    Temp:    SpO2:        Notes/comments:   Pt tolerating labor well  Forebag noted and ruptured for clear fluid  Continue labor course     Joleen Paul DO 7/11/2025 10:33 AM

## 2025-07-11 NOTE — TELEPHONE ENCOUNTER
"Regardin.5 weeks pregnant water broke  ----- Message from Jennifer DELAROSA sent at 7/10/2025  7:53 PM EDT -----  Patient stated, \"I'm 38.5 weeks pregnant and my water broke. Have been having on and off period like cramps but nothing I can time or track.\"    "

## 2025-07-11 NOTE — OB LABOR/OXYTOCIN SAFETY PROGRESS
Labor Progress Note - Camilla Cuevas 35 y.o. female MRN: [id number]    Unit/Bed#: -01 Encounter: [id number]       Contraction Frequency (minutes): 1.5-6  Contraction Intensity: Mild  Uterine Activity Characteristics: Regular  Cervical Dilation: 4        Cervical Effacement: 80  Fetal Station: 0  Baseline Rate (FHR): 135 bpm  Fetal Heart Rate (FHT): 135 BPM  FHR Category: 1               Vital Signs:   Vitals:    07/11/25 0230   BP: 104/50   Pulse: 60   Resp:    Temp: 98.6 °F (37 °C)   SpO2:      FHT category 1. SVE as above. Continue expectant management.     Yessica Fernandez MD 7/11/2025 3:55 AM

## 2025-07-11 NOTE — ASSESSMENT & PLAN NOTE
Admit to OBGYN for PROM  Clear liquid diet   F/u T&S, CBC, RPR   IVF LR 125cc/hr   Continuous fetal monitoring and tocometry   Analgesia at maternal request   Vertex by TAUS  Induction plan expectant management

## 2025-07-11 NOTE — H&P
H & P- Obstetrics   Camilla Cuevas 35 y.o. female MRN: [id number]  Unit/Bed#: LD TRIAGE  Encounter: [id number]    Assessment: 35 y.o.  at 38w3d admitted for spontaneous rupture of membranes.  SVE: 2.5/70/-3  FHT: Category 1  Clinical EFW: 90% with AC 99% ; Cephalic confirmed by US  GBS status: negative   Postpartum contraception plan: pending    By Problem:   Assessment & Plan  39 weeks gestation of pregnancy  Admit to OBGYN for PROM  Clear liquid diet   F/u T&S, CBC, RPR   IVF LR 125cc/hr   Continuous fetal monitoring and tocometry   Analgesia at maternal request   Vertex by TAUS  Induction plan expectant management  Antiphospholipid syndrome during pregnancy (HCC)  Patient on Lovenox at home  Last dose  at 2130  Held during labor   According to anesthesia documentation: Will plan to administer first Enoxaparin dose AFTER delivery; NO SOONER than 1pm 25; and AT LEAST 4 hrs after removal of epidural catheter.  Polyhydramnios  Last HERMILO 24.6cm with MVP 9.8cm at 38w3d      Discussed case and plan w/ Dr. Stephen      Chief Complaint: leaking fluid    HPI: Camilla Cuevas is a 35 y.o.  with an ARISTEO of 2025, by Last Menstrual Period at 38w3d whose pregnancy has been complicated by idiopathic poly with HERMILO 24.6 with MVP 9.8, APS on lovenox who presents to triage for LOF after NST today. She has been feeling contractions since her NST today. They are mild, more like period cramps. She had a gush of fluid at . No VB, no DFM. No other complaints.     Last dose of lovenox was 2130 on  (last night).     Problem List[1]    Baby complications/comments: none    Review of Systems   Constitutional:  Negative for chills and fever.   HENT:  Negative for ear pain and sore throat.    Eyes:  Negative for pain and visual disturbance.   Respiratory:  Negative for cough and shortness of breath.    Cardiovascular:  Negative for chest pain and palpitations.   Gastrointestinal:  Negative for abdominal pain  "and vomiting.   Genitourinary:  Negative for dysuria and hematuria.   Musculoskeletal:  Negative for arthralgias and back pain.   Skin:  Negative for color change and rash.   Neurological:  Negative for seizures and syncope.   All other systems reviewed and are negative.      OB Hx:  OB History    Para Term  AB Living   3    2    SAB IAB Ectopic Multiple Live Births   2          # Outcome Date GA Lbr Faraz/2nd Weight Sex Type Anes PTL Lv   3 Current            2 SAB 24 4w0d    SAB   ND   1 SAB  6w0d    SAB         Obstetric Comments   Menarche 14        Past Medical Hx:  Past Medical History[2]    Past Surgical hx:  Past Surgical History[3]      Allergies[4]      Medications Prior to Admission:     cholecalciferol (VITAMIN D3) 400 units tablet    docusate sodium (COLACE) 100 mg capsule    Doxylamine Succinate, Sleep, (UNISOM PO)    enoxaparin (LOVENOX) 40 mg/0.4 mL    LORazepam (ATIVAN) 0.5 mg tablet    polyethylene glycol (MiraLax Mix-In [place]) 17 g packet    Prenatal MV-Min-Fe Fum-FA-DHA (PRENATAL 1 PO)    psyllium (METAMUCIL) 58.6 % packet    Objective:  Temp:  [98.2 °F (36.8 °C)] 98.2 °F (36.8 °C)  HR:  [67-72] 72  BP: (110-113)/(65-72) 113/65  Resp:  [18] 18  SpO2:  [96 %] 96 %  Body mass index is 28.41 kg/m².     Physical Exam:  OBGyn Exam   Gen: NAD, resting comfortably in bed  Pulm: unlabored respirations  CV: RRR, well perfused  Abd: soft, gravid, nontender   SVE: 2.5/70/-3    FHT:  Baseline Rate (FHR): 135 bpm  Variability: Moderate  Accelerations: 15 x 15 or greater, At variable times    TOCO:   Contraction Frequency (minutes): 2-7  Contraction Duration (seconds): 60-90  Contraction Intensity: Mild    Lab Results   Component Value Date    WBC 11.12 (H) 2025    HGB 11.3 (L) 2025    HCT 34.2 (L) 2025     2025     No results found for: \"NA\", \"K\", \"CL\", \"CO2\", \"BUN\", \"CREATININE\", \"GLUCOSE\", \"AST\", \"ALT\"  Prenatal Labs: Reviewed, unremarkable      A +  GBS " negative    >2 Midnights  INPATIENT     Signature/Title: Yessica Fernandez MD  Date: 7/10/2025  Time: 10:13 PM         [1]   Patient Active Problem List  Diagnosis    39 weeks gestation of pregnancy    Antiphospholipid syndrome during pregnancy (HCC)    Polyhydramnios   [2]   Past Medical History:  Diagnosis Date    Abnormal Pap smear of cervix     Antiphospholipid syndrome (HCC)     HPV (human papilloma virus) infection     Migraine     Miscarriage     Ovarian cyst     Polycystic ovary syndrome     Possibly- waiting on confirmation from a specializt   [3]   Past Surgical History:  Procedure Laterality Date    COLONOSCOPY W/ BIOPSIES  2018    benign polyp    DILATION AND CURETTAGE OF UTERUS  05/2024    WISDOM TOOTH EXTRACTION     [4] No Known Allergies

## 2025-07-11 NOTE — OB LABOR/OXYTOCIN SAFETY PROGRESS
Labor Progress Note - Camilla Cuevas 35 y.o. female MRN: [id number]    Unit/Bed#: -01 Encounter: [id number]       Contraction Frequency (minutes): 3-5.5  Contraction Intensity: Mild/Moderate  Uterine Activity Characteristics: Irritability  Cervical Dilation: 4        Cervical Effacement: 80  Fetal Station: -2  Baseline Rate (FHR): 140 bpm  Fetal Heart Rate (FHT): 143 BPM  FHR Category: I               Vital Signs:   Vitals:    07/11/25 0645   BP: 109/56   Pulse: 67   Resp:    Temp:    SpO2:        Notes/comments:   Unchanged on exam.  Pt agreeable to start pit.  Pt comfortable with epidural.    Joleen Paul DO  OBGYN PGY-I  7/11/2025  7:47 AM

## 2025-07-11 NOTE — ASSESSMENT & PLAN NOTE
Patient on Lovenox at home  Last dose 7/9 at 2130  Held during labor   According to anesthesia documentation: Will plan to administer first Enoxaparin dose AFTER delivery; NO SOONER than 1pm 7/11/25; and AT LEAST 4 hrs after removal of epidural catheter.

## 2025-07-11 NOTE — OB LABOR/OXYTOCIN SAFETY PROGRESS
Oxytocin Safety Progress Check Note - Camilla Cuevas 35 y.o. female MRN: [id number]    Unit/Bed#: -01 Encounter: [id number]    Dose (ani-units/min) Oxytocin: 6 ani-units/min  Contraction Frequency (minutes): 2-3  Contraction Intensity: Moderate  Uterine Activity Characteristics: Regular  Cervical Dilation: 10        Cervical Effacement: 100  Fetal Station: 2  Baseline Rate (FHR): 130 bpm  Fetal Heart Rate (FHT): 134 BPM  FHR Category: I               Vital Signs:   Vitals:    07/11/25 1500   BP: 110/61   Pulse: 56   Resp:    Temp:    SpO2:        Notes/comments:   Patient is complete and ready to begin pushing.      Joleen Paul DO 7/11/2025 3:14 PM

## 2025-07-11 NOTE — PLAN OF CARE
Problem: PAIN - ADULT  Goal: Verbalizes/displays adequate comfort level or baseline comfort level  Description: Interventions:  - Encourage patient to monitor pain and request assistance  - Assess pain using appropriate pain scale  - Administer analgesics as ordered based on type and severity of pain and evaluate response  - Implement non-pharmacological measures as appropriate and evaluate response  - Consider cultural and social influences on pain and pain management  - Notify physician/advanced practitioner if interventions unsuccessful or patient reports new pain  - Educate patient/family on pain management process including their role and importance of  reporting pain   - Provide non-pharmacologic/complimentary pain relief interventions  Outcome: Progressing     Problem: INFECTION - ADULT  Goal: Absence or prevention of progression during hospitalization  Description: INTERVENTIONS:  - Assess and monitor for signs and symptoms of infection  - Monitor lab/diagnostic results  - Monitor all insertion sites, i.e. indwelling lines, tubes, and drains  - Monitor endotracheal if appropriate and nasal secretions for changes in amount and color  - [place] appropriate cooling/warming therapies per order  - Administer medications as ordered  - Instruct and encourage patient and family to use good hand hygiene technique  - Identify and instruct in appropriate isolation precautions for identified infection/condition  Outcome: Progressing  Goal: Absence of fever/infection during neutropenic period  Description: INTERVENTIONS:  - Monitor WBC  - Perform strict hand hygiene  - Limit to healthy visitors only  - No plants, dried, fresh or silk flowers with hernandez in patient room  Outcome: Progressing     Problem: SAFETY ADULT  Goal: Patient will remain free of falls  Description: INTERVENTIONS:  - Educate patient/family on patient safety including physical limitations  - Instruct patient to call for assistance with activity   -  Consider consulting OT/PT to assist with strengthening/mobility based on AM PAC & JH-HLM score  - Consult OT/PT to assist with strengthening/mobility   - Keep Call bell within reach  - Keep bed low and locked with side rails adjusted as appropriate  - Keep care items and personal belongings within reach  - Initiate and maintain comfort rounds  - Make Fall Risk Sign visible to staff  - Apply yellow socks and bracelet for high fall risk patients  - Consider moving patient to room near nurses station  Outcome: Progressing  Goal: Maintain or return to baseline ADL function  Description: INTERVENTIONS:  -  Assess patient's ability to carry out ADLs; assess patient's baseline for ADL function and identify physical deficits which impact ability to perform ADLs (bathing, care of mouth/teeth, toileting, grooming, dressing, etc.)  - Assess/evaluate cause of self-care deficits   - Assess range of motion  - Assess patient's mobility; develop plan if impaired  - Assess patient's need for assistive devices and provide as appropriate  - Encourage maximum independence but intervene and supervise when necessary  - Involve family in performance of ADLs  - Assess for home care needs following discharge   - Consider OT consult to assist with ADL evaluation and planning for discharge  - Provide patient education as appropriate  - Monitor functional capacity and physical performance, use of AM PAC & JH-HLM   - Monitor gait, balance and fatigue with ambulation    Outcome: Progressing  Goal: Maintains/Returns to pre admission functional level  Description: INTERVENTIONS:  - Perform AM-PAC 6 Click Basic Mobility/ Daily Activity assessment daily.  - Set and communicate daily mobility goal to care team and patient/family/caregiver.   - Collaborate with rehabilitation services on mobility goals if consulted  - Out of bed for toileting  - Record patient progress and toleration of activity level   Outcome: Progressing     Problem: DISCHARGE  PLANNING  Goal: Discharge to home or other facility with appropriate resources  Description: INTERVENTIONS:  - Identify barriers to discharge w/patient and caregiver  - Arrange for needed discharge resources and transportation as appropriate  - Identify discharge learning needs (meds, wound care, etc.)  - Arrange for interpretive services to assist at discharge as needed  - Refer to Case Management Department for coordinating discharge planning if the patient needs post-hospital services based on physician/advanced practitioner order or complex needs related to functional status, cognitive ability, or social support system  Outcome: Progressing     Problem: Knowledge Deficit  Goal: Patient/family/caregiver demonstrates understanding of disease process, treatment plan, medications, and discharge instructions  Description: Complete learning assessment and assess knowledge base.  Interventions:  - Provide teaching at level of understanding  - Provide teaching via preferred learning methods  Outcome: Progressing  Goal: Verbalizes understanding of labor plan  Description: Assess patient/family/caregiver's baseline knowledge level and ability to understand information.  Provide education via patient/family/caregiver's preferred learning method at appropriate level of understanding.     1. Provide teaching at level of understanding.  2. Provide teaching via preferred learning method(s).  Outcome: Progressing     Problem: Labor & Delivery  Goal: Manages discomfort  Description: Assess and monitor for signs and symptoms of discomfort.  Assess patient's pain level regularly and per hospital policy.  Administer medications as ordered. Support use of nonpharmacological methods to help control pain such as distraction, imagery, relaxation, and application of heat and cold.  Collaborate with interdisciplinary team and patient to determine appropriate pain management plan.    1. Include patient in decisions related to comfort.  2.  Offer non-pharmacological pain management interventions.  3. Report ineffective pain management to physician.  Outcome: Progressing  Goal: Patient vital signs are stable  Description: 1. Assess vital signs - vaginal delivery.  Outcome: Progressing

## 2025-07-11 NOTE — TELEPHONE ENCOUNTER
"REASON FOR CONVERSATION: Pregnancy Problem    SYMPTOMS: 38w3d pregnant, water broke, feeling cramps.     OTHER HEALTH INFORMATION: in for stress test today, was told she was having contractions.     PROTOCOL DISPOSITION: Go to [facility]    CARE ADVICE PROVIDED: have another adult drive you to the L&D triage, have extra towels/blankets to sit on in the car    PRACTICE FOLLOW-UP: NA          Reason for Disposition   Leakage of fluid from vagina    Answer Assessment - Initial Assessment Questions  1. ONSET: \"When did the symptoms begin?\"           About 15 minutes ago her water broke    2. CONTRACTIONS: \"Describe the contractions that you are having.\" (e.g., duration, frequency, regularity, severity)        Has been feeling cramps but nothing that she has been able to time.     3. PREGNANCY: \"How many weeks pregnant are you?\"        38w3d    4. ARISTEO: \"What date are you expecting to deliver?\"        7/21    5. PARITY: \"Have you had a baby before?\" If Yes, ask: \"How long did the labor last?\"        Baby #1     6. FETAL MOVEMENT: \"Has the baby's movement decreased or changed significantly from normal?\"        Same fetal movement     7. OTHER SYMPTOMS: \"Do you have any other symptoms?\" (e.g., leaking fluid from vagina, vaginal bleeding, fever, hand/facial swelling)        Water broke about 15 minutes ago    Protocols used: Pregnancy - Labor-Adult-    "

## 2025-07-11 NOTE — ANESTHESIA PREPROCEDURE EVALUATION
Procedure:  LABOR ANALGESIA    Patient requests labor epidural    Relevant Problems   GYN   (+) 39 weeks gestation of pregnancy   Antiphospholipid syndrome on Enoxaparin usually 40mg nightly during pregnancy, last dose was 7/9/25 at 2130     Physical Exam    Airway     Mallampati score: II          Cardiovascular      Dental       Pulmonary      Neurological      Other Findings  Gravis uteruspost-pubertal.Gravis uterus        Anesthesia Plan  ASA Score- 2     Anesthesia Type- epidural with ASA Monitors.         Additional Monitors:     Airway Plan: natural airway.    Comment: Discussed NANO guidelines for Enoxaparin with OB Team.  OK to resume low dose Enoxaparin 40mg QD at 1pm today; however medication also needs to be held 12hr prior to removal.   Will plan to administer first Enoxaparin dose AFTER delivery; NO SOONER than 1pm 7/11/25; and AT LEAST 4 hrs after removal of epidural catheter.       Plan Factors-Exercise tolerance (METS): >4 METS.    Chart reviewed.   Existing labs reviewed. Patient summary reviewed.                  Induction-     Postoperative Plan- .   Monitoring Plan - Monitoring plan - standard ASA monitoring      Perioperative Resuscitation Plan - Level 1 - Full Code.       Informed Consent- Anesthetic plan and risks discussed with patient.  I personally reviewed this patient with the CRNA. Discussed and agreed on the Anesthesia Plan with the CRNA..      NPO Status:  Vitals Value Taken Time   Date of last liquid 07/10/25 07/10/25 21:02   Time of last liquid 2100 07/10/25 21:02   Date of last solid 07/10/25 07/10/25 21:02   Time of last solid 1930 07/10/25 21:02

## 2025-07-11 NOTE — ANESTHESIA PROCEDURE NOTES
Epidural Block    Patient location during procedure: OB/L&D  Start time: 7/11/2025 1:01 AM  Reason for block: procedure for pain  Staffing  Performed by: Neli Shook MD  Authorized by: Neli Shook MD    Preanesthetic Checklist  Completed: patient identified, IV checked, site marked, risks and benefits discussed, surgical consent, monitors and equipment checked, pre-op evaluation and timeout performed  Epidural  Patient position: sitting  Prep: ChloraPrep  Sedation Level: no sedation  Patient monitoring: frequent blood pressure checks, continuous pulse oximetry and heart rate  Approach: midline  Location: lumbar, L3-4  Injection technique: MISTY air  Needle  Needle type: Tuohy   Needle gauge: 17 G  Needle insertion depth: 5.5 cm  Catheter type: multi-orifice  Catheter size: 19 G  Catheter at skin depth: 11 cm  Catheter securement method: stabilization device and clear occlusive dressing  Test dose: negativelidocaine-epinephrine (XYLOCAINE-MPF/EPINEPHRINE) 1.5 %-1:200,000 injection 3 mL - Epidural   5 mL - 7/11/2025 1:04:00 AM  Assessment  Number of attempts: 1negative aspiration for CSF, negative aspiration for heme and no paresthesia on injection  patient tolerated the procedure well with no immediate complications  Additional Notes  Uncomplicated placement  Single skin poke  Misty to air at 5.5 cm; Catheter threaded to 11  No evidence of immediate complications; patient tolerated the procedure well

## 2025-07-11 NOTE — QUICK NOTE
2 hr huddle.   Patient started pushing at 1526.  Good maternal effort.   Reassuring fetal status.   Contractions every 2 minutes.     Patient asked for break from pushing due to maternal exhaustion. 10 min break per patient request.       Discussed indications for vacuum assisted delivery.    - maternal exhaustion   - non reassuring fetal status   - prolonged 2nd stage - >4hrs in P0 with epidural.   Reviewed risks/benefits.       All questions answered.   Will resume pushing.   Patient repositioned, feeling more pressure

## 2025-07-11 NOTE — QUICK NOTE
Restarting Enoxaparin:    Per NANO Guidelines -    Will plan to administer first Enoxaparin dose AFTER delivery; NO SOONER than 1pm 7/11/25; and AT LEAST 4 hrs after removal of epidural catheter.

## 2025-07-11 NOTE — QUICK NOTE
3 hr huddle (late entry)    1755: patient requesting push break.  Patient received epidural bolus and rested while that set up.   Patient more comfortable after bolus, now c/o rectal pressure.   Restarted pushing around 1830 with improved maternal effort.   Minimal descent of fetus. + caput. Labia swollen b/l.   Discussed continued pushing since effort improved post break.   Patient wishes to continue to push. Concern for CPD discussed with patient and her partner. Will re-eval at 1920.   Fetal tracing reassuring.

## 2025-07-12 LAB
ERYTHROCYTE [DISTWIDTH] IN BLOOD BY AUTOMATED COUNT: 13.4 % (ref 11.6–15.1)
HCT VFR BLD AUTO: 26.6 % (ref 34.8–46.1)
HGB BLD-MCNC: 8.5 G/DL (ref 11.5–15.4)
MCH RBC QN AUTO: 28.1 PG (ref 26.8–34.3)
MCHC RBC AUTO-ENTMCNC: 32 G/DL (ref 31.4–37.4)
MCV RBC AUTO: 88 FL (ref 82–98)
PLATELET # BLD AUTO: 243 THOUSANDS/UL (ref 149–390)
PMV BLD AUTO: 9.3 FL (ref 8.9–12.7)
RBC # BLD AUTO: 3.02 MILLION/UL (ref 3.81–5.12)
WBC # BLD AUTO: 23.93 THOUSAND/UL (ref 4.31–10.16)

## 2025-07-12 PROCEDURE — 85027 COMPLETE CBC AUTOMATED: CPT

## 2025-07-12 PROCEDURE — 99024 POSTOP FOLLOW-UP VISIT: CPT | Performed by: OBSTETRICS & GYNECOLOGY

## 2025-07-12 RX ORDER — KETOROLAC TROMETHAMINE 30 MG/ML
15 INJECTION, SOLUTION INTRAMUSCULAR; INTRAVENOUS EVERY 6 HOURS
Status: DISPENSED | OUTPATIENT
Start: 2025-07-12 | End: 2025-07-13

## 2025-07-12 RX ORDER — DIPHENHYDRAMINE HCL 25 MG
25 TABLET ORAL EVERY 6 HOURS PRN
Status: DISCONTINUED | OUTPATIENT
Start: 2025-07-12 | End: 2025-07-13 | Stop reason: HOSPADM

## 2025-07-12 RX ORDER — SODIUM CHLORIDE, SODIUM LACTATE, POTASSIUM CHLORIDE, CALCIUM CHLORIDE 600; 310; 30; 20 MG/100ML; MG/100ML; MG/100ML; MG/100ML
125 INJECTION, SOLUTION INTRAVENOUS CONTINUOUS
Status: DISCONTINUED | OUTPATIENT
Start: 2025-07-12 | End: 2025-07-13 | Stop reason: HOSPADM

## 2025-07-12 RX ORDER — SODIUM CHLORIDE, SODIUM LACTATE, POTASSIUM CHLORIDE, CALCIUM CHLORIDE 600; 310; 30; 20 MG/100ML; MG/100ML; MG/100ML; MG/100ML
125 INJECTION, SOLUTION INTRAVENOUS CONTINUOUS
Status: DISCONTINUED | OUTPATIENT
Start: 2025-07-12 | End: 2025-07-12

## 2025-07-12 RX ORDER — KETOROLAC TROMETHAMINE 30 MG/ML
15 INJECTION, SOLUTION INTRAMUSCULAR; INTRAVENOUS EVERY 6 HOURS SCHEDULED
Status: DISCONTINUED | OUTPATIENT
Start: 2025-07-12 | End: 2025-07-12 | Stop reason: SDUPTHER

## 2025-07-12 RX ORDER — OXYTOCIN/0.9 % SODIUM CHLORIDE 30/500 ML
250 PLASTIC BAG, INJECTION (ML) INTRAVENOUS ONCE
Status: DISCONTINUED | OUTPATIENT
Start: 2025-07-12 | End: 2025-07-13 | Stop reason: HOSPADM

## 2025-07-12 RX ORDER — ACETAMINOPHEN 325 MG/1
650 TABLET ORAL EVERY 6 HOURS SCHEDULED
Status: DISCONTINUED | OUTPATIENT
Start: 2025-07-12 | End: 2025-07-13 | Stop reason: HOSPADM

## 2025-07-12 RX ORDER — ONDANSETRON 2 MG/ML
4 INJECTION INTRAMUSCULAR; INTRAVENOUS EVERY 8 HOURS PRN
Status: DISCONTINUED | OUTPATIENT
Start: 2025-07-12 | End: 2025-07-13 | Stop reason: HOSPADM

## 2025-07-12 RX ORDER — POLYETHYLENE GLYCOL 3350 17 G/17G
17 POWDER, FOR SOLUTION ORAL DAILY
Status: DISCONTINUED | OUTPATIENT
Start: 2025-07-12 | End: 2025-07-12

## 2025-07-12 RX ORDER — CALCIUM CARBONATE 500 MG/1
1000 TABLET, CHEWABLE ORAL DAILY PRN
Status: DISCONTINUED | OUTPATIENT
Start: 2025-07-12 | End: 2025-07-13 | Stop reason: HOSPADM

## 2025-07-12 RX ORDER — ACETAMINOPHEN 325 MG/1
650 TABLET ORAL EVERY 6 HOURS SCHEDULED
Status: DISCONTINUED | OUTPATIENT
Start: 2025-07-12 | End: 2025-07-12 | Stop reason: SDUPTHER

## 2025-07-12 RX ORDER — IBUPROFEN 600 MG/1
600 TABLET, FILM COATED ORAL EVERY 6 HOURS
Status: DISCONTINUED | OUTPATIENT
Start: 2025-07-13 | End: 2025-07-12 | Stop reason: SDUPTHER

## 2025-07-12 RX ORDER — IBUPROFEN 600 MG/1
600 TABLET, FILM COATED ORAL EVERY 6 HOURS
Status: DISCONTINUED | OUTPATIENT
Start: 2025-07-13 | End: 2025-07-13 | Stop reason: HOSPADM

## 2025-07-12 RX ORDER — NALOXONE HYDROCHLORIDE 0.4 MG/ML
0.1 INJECTION, SOLUTION INTRAMUSCULAR; INTRAVENOUS; SUBCUTANEOUS
Status: ACTIVE | OUTPATIENT
Start: 2025-07-12 | End: 2025-07-13

## 2025-07-12 RX ORDER — SIMETHICONE 80 MG
80 TABLET,CHEWABLE ORAL 4 TIMES DAILY PRN
Status: DISCONTINUED | OUTPATIENT
Start: 2025-07-12 | End: 2025-07-13 | Stop reason: HOSPADM

## 2025-07-12 RX ORDER — BENZOCAINE/MENTHOL 6 MG-10 MG
1 LOZENGE MUCOUS MEMBRANE DAILY PRN
Status: DISCONTINUED | OUTPATIENT
Start: 2025-07-12 | End: 2025-07-13 | Stop reason: HOSPADM

## 2025-07-12 RX ORDER — DOCUSATE SODIUM 100 MG/1
100 CAPSULE, LIQUID FILLED ORAL 2 TIMES DAILY
Status: DISCONTINUED | OUTPATIENT
Start: 2025-07-12 | End: 2025-07-13 | Stop reason: HOSPADM

## 2025-07-12 RX ADMIN — ACETAMINOPHEN 975 MG: 325 TABLET ORAL at 01:19

## 2025-07-12 RX ADMIN — KETOROLAC TROMETHAMINE 15 MG: 30 INJECTION, SOLUTION INTRAMUSCULAR; INTRAVENOUS at 18:51

## 2025-07-12 RX ADMIN — ACETAMINOPHEN 650 MG: 325 TABLET ORAL at 15:22

## 2025-07-12 RX ADMIN — BENZOCAINE AND LEVOMENTHOL 1 APPLICATION: 200; 5 SPRAY TOPICAL at 08:46

## 2025-07-12 RX ADMIN — DOCUSATE SODIUM 100 MG: 100 CAPSULE, LIQUID FILLED ORAL at 12:51

## 2025-07-12 RX ADMIN — ACETAMINOPHEN 650 MG: 325 TABLET ORAL at 21:59

## 2025-07-12 RX ADMIN — KETOROLAC TROMETHAMINE 15 MG: 30 INJECTION, SOLUTION INTRAMUSCULAR; INTRAVENOUS at 12:51

## 2025-07-12 RX ADMIN — KETOROLAC TROMETHAMINE 15 MG: 30 INJECTION, SOLUTION INTRAMUSCULAR; INTRAVENOUS at 04:53

## 2025-07-12 RX ADMIN — SODIUM CHLORIDE, SODIUM LACTATE, POTASSIUM CHLORIDE, AND CALCIUM CHLORIDE 125 ML/HR: .6; .31; .03; .02 INJECTION, SOLUTION INTRAVENOUS at 05:02

## 2025-07-12 RX ADMIN — ACETAMINOPHEN 650 MG: 325 TABLET ORAL at 08:46

## 2025-07-12 RX ADMIN — DOCUSATE SODIUM 100 MG: 100 CAPSULE, LIQUID FILLED ORAL at 18:51

## 2025-07-12 RX ADMIN — IRON SUCROSE 200 MG: 20 INJECTION, SOLUTION INTRAVENOUS at 15:22

## 2025-07-12 RX ADMIN — ENOXAPARIN SODIUM 40 MG: 40 INJECTION SUBCUTANEOUS at 08:46

## 2025-07-12 NOTE — PLAN OF CARE
Problem: PAIN - ADULT  Goal: Verbalizes/displays adequate comfort level or baseline comfort level  Description: Interventions:  - Encourage patient to monitor pain and request assistance  - Assess pain using appropriate pain scale  - Administer analgesics as ordered based on type and severity of pain and evaluate response  - Implement non-pharmacological measures as appropriate and evaluate response  - Consider cultural and social influences on pain and pain management  - Notify physician/advanced practitioner if interventions unsuccessful or patient reports new pain  - Educate patient/family on pain management process including their role and importance of  reporting pain   - Provide non-pharmacologic/complimentary pain relief interventions  Outcome: Progressing     Problem: INFECTION - ADULT  Goal: Absence or prevention of progression during hospitalization  Description: INTERVENTIONS:  - Assess and monitor for signs and symptoms of infection  - Monitor lab/diagnostic results  - Monitor all insertion sites, i.e. indwelling lines, tubes, and drains  - Monitor endotracheal if appropriate and nasal secretions for changes in amount and color  - [place] appropriate cooling/warming therapies per order  - Administer medications as ordered  - Instruct and encourage patient and family to use good hand hygiene technique  - Identify and instruct in appropriate isolation precautions for identified infection/condition  Outcome: Progressing  Goal: Absence of fever/infection during neutropenic period  Description: INTERVENTIONS:  - Monitor WBC  - Perform strict hand hygiene  - Limit to healthy visitors only  - No plants, dried, fresh or silk flowers with hernandez in patient room  Outcome: Progressing     Problem: SAFETY ADULT  Goal: Patient will remain free of falls  Description: INTERVENTIONS:  - Educate patient/family on patient safety including physical limitations  - Instruct patient to call for assistance with activity   -  Consider consulting OT/PT to assist with strengthening/mobility based on AM PAC & JH-HLM score  - Consult OT/PT to assist with strengthening/mobility   - Keep Call bell within reach  - Keep bed low and locked with side rails adjusted as appropriate  - Keep care items and personal belongings within reach  - Initiate and maintain comfort rounds  - Make Fall Risk Sign visible to staff  - Apply yellow socks and bracelet for high fall risk patients  - Consider moving patient to room near nurses station  Outcome: Progressing  Goal: Maintain or return to baseline ADL function  Description: INTERVENTIONS:  -  Assess patient's ability to carry out ADLs; assess patient's baseline for ADL function and identify physical deficits which impact ability to perform ADLs (bathing, care of mouth/teeth, toileting, grooming, dressing, etc.)  - Assess/evaluate cause of self-care deficits   - Assess range of motion  - Assess patient's mobility; develop plan if impaired  - Assess patient's need for assistive devices and provide as appropriate  - Encourage maximum independence but intervene and supervise when necessary  - Involve family in performance of ADLs  - Assess for home care needs following discharge   - Consider OT consult to assist with ADL evaluation and planning for discharge  - Provide patient education as appropriate  - Monitor functional capacity and physical performance, use of AM PAC & JH-HLM   - Monitor gait, balance and fatigue with ambulation    Outcome: Progressing  Goal: Maintains/Returns to pre admission functional level  Description: INTERVENTIONS:  - Perform AM-PAC 6 Click Basic Mobility/ Daily Activity assessment daily.  - Set and communicate daily mobility goal to care team and patient/family/caregiver.   - Collaborate with rehabilitation services on mobility goals if consulted  - Out of bed for toileting  - Record patient progress and toleration of activity level   Outcome: Progressing     Problem: DISCHARGE  PLANNING  Goal: Discharge to home or other facility with appropriate resources  Description: INTERVENTIONS:  - Identify barriers to discharge w/patient and caregiver  - Arrange for needed discharge resources and transportation as appropriate  - Identify discharge learning needs (meds, wound care, etc.)  - Arrange for interpretive services to assist at discharge as needed  - Refer to Case Management Department for coordinating discharge planning if the patient needs post-hospital services based on physician/advanced practitioner order or complex needs related to functional status, cognitive ability, or social support system  Outcome: Progressing     Problem: POSTPARTUM  Goal: Experiences normal postpartum course  Description: INTERVENTIONS:  - Monitor maternal vital signs  - Assess uterine involution and lochia  Outcome: Progressing  Goal: Appropriate maternal -  bonding  Description: INTERVENTIONS:  - Identify family support  - Assess for appropriate maternal/infant bonding   -Encourage maternal/infant bonding opportunities  - Referral to  or  as needed  Outcome: Progressing  Goal: Establishment of infant feeding pattern  Description: INTERVENTIONS:  - Assess breast/bottle feeding  - Refer to lactation as needed  Outcome: Progressing  Goal: Incision(s), wounds(s) or drain site(s) healing without S/S of infection  Description: INTERVENTIONS  - Assess and document dressing, incision, wound bed, drain sites and surrounding tissue  - Provide patient and family education  Outcome: Progressing

## 2025-07-12 NOTE — ASSESSMENT & PLAN NOTE
Patient on Lovenox at home  Last dose 7/9 at 2130  Held during labor   According to anesthesia documentation: Will plan to administer first Enoxaparin dose AFTER delivery; NO SOONER than 1pm 7/11/25; and AT LEAST 4 hrs after removal of epidural catheter.  Ordered to restart 7/12 at 9A

## 2025-07-12 NOTE — DISCHARGE SUMMARY
Discharge Summary - OB/GYN   Name: Camilla Cuevas 35 y.o. female I MRN: [id number]  Unit/Bed#: -01 I Date of Admission: 7/10/2025   Date of Service: 2025 I Hospital Day: 3    ADMISSION  Patient of: [facility]  Admission Date: 7/10/2025   Admitting Attending: Dr. Mindy salmon. providers found  Admitting Diagnoses: Problem List[1]    DELIVERY  Delivery Method: , Low Transverse   Delivery Date and Time: 2025 8:17 PM  Delivery Attending: Tatyana Ram    DISCHARGE  Discharge Date: 25  Discharge Attending: Dr. Tatyana Ram  Discharge Diagnosis:   Same, Delivered    Clinical course: Admission to Delivery  Camilla Cuevas is a 35 y.o.  who was admitted at 38w4d for spontaneous rupture of membranes.  Her pregnancy was notable for antiphospholipid syndrome for which she took Lovenox.  Fetal estimated weight was expected to be in the 99th percentile.  She was counseled on shoulder dystocia.  On admission she was 2.5/70/-3 she was managed expectantly until she reached 4 cm and made no further change.  She was started on Pitocin.  She progressed to complete and began pushing.  After 4 hours of pushing no further descent from +2 station was made and the decision was made to proceed with primary low-transverse  for arrest of descent.  Delivery  Route of Delivery: , Low Transverse  Reason for  delivery: Arrest of Descent      Anesthesia: Epidural,   QBL:      1,303mL    Delivery: , Low Transverse at 2025 8:17 PM      Baby's Weight: 3610 g (7 lb 15.3 oz); 127.34    Apgar scores: 8  and 9  at 1 and 5 minutes, respectively    Clinical Course: Post-Delivery:  The post delivery course was unremarkable. Home lovenox for antiphospholipid antibody syndrome was restarted on .    On the day of discharge, the patient was ambulating, voiding spontaneously, tolerating oral intake, and hemodynamically stable. She was able to  reasonably perform all ADLs. She had appropriate bowel function. Pain was well-controlled. She was discharged home on postpartum/postop day #2 without complications. Patient was instructed to follow up with her OB as an outpatient and was given appropriate warnings to call her provider with problems or concerns.    Pertinent lab findings included:   Blood type A+.     Last three Hgb values:  Lab Results   Component Value Date    HGB 8.5 (L) 2025    HGB 11.1 (L) 07/10/2025    HGB 11.3 (L) 2025        Problem-specific follow-up plans included the following:  Assessment & Plan  S/P primary low transverse   Routine postpartum care  Encourage ambulation  Encourage familial bonding  Lactation support as needed  Pain: Motrin/Tylenol around the clock, oxycodone if needed  Pre-delivery Hgb 11.1 --> Post Delivery f/u am cbc  Price catheter: in, for VT   DVT Ppx: ambulation, SCDs, Lovenox 40mg for APLS    Antiphospholipid syndrome during pregnancy (HCC)  Patient on Lovenox at home  Last dose  at 2130  Held during labor   According to anesthesia documentation: Will plan to administer first Enoxaparin dose AFTER delivery; NO SOONER than 1pm 25; and AT LEAST 4 hrs after removal of epidural catheter.  Restarted  at 9A, changed to 9P  per pt request      Discharge med list:  Contraception: planning to discuss at PP visit     Medication List      START taking these medications     acetaminophen 325 mg tablet; Commonly known as: TYLENOL; Take 2 tablets   (650 mg total) by mouth every 6 (six) hours for 8 doses   benzocaine-menthol-lanolin-aloe 20-0.5 % topical spray; Commonly known   as: DERMOPLAST; Apply 1 Application topically every 6 (six) hours as   needed for irritation   calcium carbonate 500 mg chewable tablet; Commonly known as: TUMS; Chew   2 tablets (1,000 mg total) daily as needed for indigestion or heartburn   diphenhydrAMINE 25 mg tablet; Commonly known as: BENADRYL; Take 1 tablet    (25 mg total) by mouth every 6 (six) hours as needed for itching (Itching)   hydrocortisone 1 % cream; Apply 1 Application topically daily as needed   for irritation   ibuprofen 600 mg tablet; Commonly known as: MOTRIN; Take 1 tablet (600   mg total) by mouth every 6 (six) hours   oxyCODONE 5 immediate release tablet; Commonly known as: Roxicodone;   Take 1 tablet (5 mg total) by mouth every 4 (four) hours as needed for   moderate pain for up to 10 days Max Daily Amount: 30 mg   simethicone 80 mg chewable tablet; Commonly known as: MYLICON; Chew 1   tablet (80 mg total) 4 (four) times a day as needed for flatulence   witch hazel-glycerin topical pad; Commonly known as: TUCKS; Apply 1 Pad   topically every 4 (four) hours as needed for irritation     CONTINUE taking these medications     cholecalciferol 400 units tablet; Commonly known as: VITAMIN D3   docusate sodium 100 mg capsule; Commonly known as: COLACE   enoxaparin 40 mg/0.4 mL; Commonly known as: LOVENOX; Inject 0.4 mL (40   mg total) under the skin in the morning   PRENATAL 1 PO   psyllium 58.6 % packet; Commonly known as: METAMUCIL   UNISOM PO     STOP taking these medications     LORazepam 0.5 mg tablet; Commonly known as: ATIVAN   MiraLax Mix-In [place] 17 g packet; Generic drug: polyethylene glycol       Condition at discharge:   good     Disposition:   Home    Planned Readmission:   No    Bren Moore MD  (they/them)  Obstetrics & Gynecology PGY-1         [1]   Patient Active Problem List  Diagnosis    S/P primary low transverse     Antiphospholipid syndrome during pregnancy (HCC)    Polyhydramnios

## 2025-07-12 NOTE — ASSESSMENT & PLAN NOTE
Routine postpartum care  Encourage ambulation  Encourage familial bonding  Lactation support as needed  Pain: Motrin/Tylenol around the clock, oxycodone if needed  Pre-delivery Hgb 11.1 --> Post Delivery f/u am cbc  Price catheter: in, for VT   DVT Ppx: ambulation, SCDs, Lovenox 40mg for APLS

## 2025-07-12 NOTE — ANESTHESIA POSTPROCEDURE EVALUATION
Post-Op Assessment Note    CV Status:  Stable  Pain Score: 0    Pain management: adequate       Mental Status:  Alert and sleepy   Hydration Status:  Euvolemic   PONV Controlled:  Controlled   Airway Patency:  Patent     Post Op Vitals Reviewed: Yes    No anethesia notable event occurred.    Staff: CRNA           Last Filed PACU Vitals:  Vitals Value Taken Time   Temp 97    Pulse 67    /78    Resp 18    SpO2 99

## 2025-07-12 NOTE — OP NOTE
OPERATIVE REPORT  PATIENT NAME: Camilla Cuevas    :  1990  MRN: [id number]  Pt Location: [facility] L&D OR ROOM 02    SURGERY DATE: 2025    Surgeons and Role:     * Tatyana Ram DO - Primary     * Maya Trevino DO - Assisting    Preop Diagnosis:  Arrest of descent, delivered, current hospitalization [O62.1]    Post-Op Diagnosis Codes:     * Arrest of descent, delivered, current hospitalization [O62.1]    Procedure(s) (LRB):   SECTION () (N/A)    Specimen(s):  ID Type Source Tests Collected by Time Destination   A :  Tissue (Placenta on Hold) OB Only Placenta PLACENTA IN STORAGE Tatyana Ram DO 2025    B :  Cord Blood Cord BLOOD GAS, VENOUS, CORD, BLOOD GAS, ARTERIAL, CORD Myaa Trevino,  2025        Surgical QBL:  Surgical QBL (mL): 1303 mL      Drains:  Urethral Catheter Double-lumen 16 Fr. (Active)   Goal for Removal Voiding trial when ambulation improves 25 0401   Site Assessment Clean;Skin intact 25   Price Care Done 25   Collection Container Standard drainage bag 25   Securement Method Securing device (Describe) 25   Output (mL) 300 mL 25 1530   Number of days: 0       Anesthesia Type:   Epidural    Operative Indications:  Arrest of descent, delivered, current hospitalization [O62.1]       [facility] Classification System:  No Multiple pregnancy, No Transverse or oblique lie, No Breech lie, Gestational age is > or =37 weeks, Nulliparous, Labor induced +  is FLORENCIO GROUP 2a    Brief History  Camilla Cuevas is a 35 y.o.  who was admitted at 38w4d for spontaneous rupture of membranes.  Her pregnancy was notable for antiphospholipid syndrome for which she took Lovenox.  Fetal estimated weight was expected to be in the 99th percentile.  She was counseled on shoulder dystocia.  On admission she was 2.5/70/-3 she was managed expectantly until she reached 4 cm and made no further  change.  She was started on Pitocin.  She progressed to complete and began pushing.  After 4 hours of pushing no further descent from +2 station was made and the decision was made to proceed with primary low-transverse  for arrest of descent.  Delivery    Operative Findings:  1. Viable male  on 2025 at 2017 with APGARs of 8 and 9 at 1 and 5 minutes. Fetus weighted 7lb 15oz.  2.  Meconium amniotic fluid  3. Normal intact placenta with centrally inserted 3VC.  4. Normal uterus, bilateral tubes and ovaries  5. Adhesions absent      Umbilical Cord Venous Blood Gas:  Results from last 7 days   Lab Units 25   PH COV  7.353   PCO2 COV mm HG 37.7   HCO3 COV mmol/L 20.5   BASE EXC COV mmol/L -4.5*   O2 CT CD VB mL/dL 9.8   O2 HGB, VENOUS CORD % 48.2     Umbilical Cord Arterial Blood Gas:  Results from last 7 days   Lab Units 25   PH COA  7.236   PCO2 COA  49.5   PO2 COA mm HG 16.1   HCO3 COA mmol/L 20.5   BASE EXC COA mmol/L -7.1*   O2 CONTENT CORD ART ml/dl 6.3   O2 HGB, ARTERIAL CORD % 29.9       Operative Technique  The patient was taken to the operating room. Epidural anesthesia was adequately established and Ancef/azithromycin was given for preoperative prophylaxis. The patient was then placed in the dorsal supine position with a left tilt of the hips. The patient was then prepped with chlorhexidine for vaginal prep and chloraprep for abdominal prep. A fetal pillow was inserted and was inflated with 180cc of sterile saline. The patient was draped in the usual sterile fashion for a Pfannenstiel skin incision.      A time out was performed to confirm correct patient and correct procedure.     An incision was made in the skin with a surgical scalpel and sharp dissection was carried out over subsequent layers of tissue including the fascia, followed by the Bovie electrocautery for hemostasis. The fascia was incised at the midline and the fascial incision was extended bilaterally  using the curved Amaya scissors.      The superior edge of the fascial incision was grasped with Kocher clamps, tented up and the underlying rectus muscles were dissected off bluntly and sharply using the amaya scissors. The same was done inferiorly. The rectus muscles were then divided at midline and the peritoneum was identified, tented up at its upper margin taking care to avoid the bladder, and then entered. The peritoneal incision was extended superiorly and inferiorly.     The bladder blade was inserted and a transverse incision was made in the lower uterine segment using a new surgical blade and was entered bluntly. The uterine incision was extended cephalad and caudal using blunt dissection. The amniotic sac was entered.    The surgeon's hand was placed into the uterine cavity. The fetal head was identified and elevated through the uterine incision with the assistance of fundal pressure. With gentle traction, the shoulder was delivered, followed by the rest of the fetal body. There was a triple nuchal cord noted which was reduced. On delivery the cord was doubly clamped and cut after delayed cord clamping. The infant was then passed off the table to the awaiting  staff. The  was noted to cry spontaneously and moved all extremities. Venous and arterial blood gas, cord blood, and portion of cord was obtained for analysis and routine blood testing. The placenta delivery was then sent to storage. Placenta was noted to be intact with a centrally inserted three-vessel cord.  Oxytocin was administered by IV infusion to enhance uterine contraction. The uterus was exteriorized and cleared of all clots and remaining products of conception.  TXA was given prophylactically.    The uterine incision was re approximated using an 0 Monocryl in a running locked fashion.  A right uterine extension was noted that was reapproximated in the uterine closure. A second vertical imbricating stitch with the same suture  was applied. The uterine incision was examined and noted to be hemostatic. The posterior cul-de-sac was cleared of all clots and products of conception. The uterus was replaced into the abdomen and the pericolic gutters were cleared of all clots.  The uterine incision was once again reexamined and noted to be hemostatic.  The peritoneum was reapproximated in a pursestring fashion using 2-0 plain gut. The fascia was re approximated using an 0 Vicryl in a running nonlocked fashion. The subcutaneous tissue was irrigated and cleared of all clots and debris. Good hemostasis was noted with Bovie electrocautery. The skin incision was closed using stratafix with exofin. Good hemostasis was noted. Patient tolerated the procedure well. All needle, sponge, and instrument counts were noted to be correct x 2 at the end of the procedure.  The fetal pillow was removed at the conclusion of the procedure. Patient was transferred to the recovery room in stable condition. Dr. Ram was present and participated in the entire surgery.    Complications:   None apparent      I was present for the entire procedure.    Patient Disposition:  PACU         SIGNATURE: Maya Trevino DO  DATE: July 11, 2025  TIME: 10:05 PM

## 2025-07-12 NOTE — PROGRESS NOTES
Progress Note - OB/GYN   Name: Camilla Cuevas 35 y.o. female I MRN: [id number]  Unit/Bed#: -01 I Date of Admission: 7/10/2025   Date of Service: 2025 I Hospital Day: 2     Assessment & Plan  S/P primary low transverse   Routine postpartum care  Encourage ambulation  Encourage familial bonding  Lactation support as needed  Pain: Motrin/Tylenol around the clock, oxycodone if needed  Pre-delivery Hgb 11.1 --> Post Delivery f/u am cbc  Stokes catheter: in, for VT   DVT Ppx: ambulation, SCDs, Lovenox 40mg for APLS      Antiphospholipid syndrome during pregnancy (HCC)  Patient on Lovenox at home  Last dose  at 2130  Held during labor   According to anesthesia documentation: Will plan to administer first Enoxaparin dose AFTER delivery; NO SOONER than 1pm 25; and AT LEAST 4 hrs after removal of epidural catheter.  Ordered to restart  at 9A    OB Post-Partum Progress Note  Subjective   Post delivery. Patient is doing well. Lochia WNL. Pain well controlled.     Pain: yes, cramping, improved with meds  Tolerating PO: yes  Voiding: stokes in  Flatus: no  BM: no  Ambulating: yes  Breastfeeding:  yes  Chest pain: no  Shortness of breath: no  Leg pain: no  Lochia: WNL    Objective :  Temp:  [97.7 °F (36.5 °C)-100 °F (37.8 °C)] 97.7 °F (36.5 °C)  HR:  [] 80  BP: ()/(46-81) 109/59  Resp:  [16-20] 18  SpO2:  [94 %-100 %] 96 %  O2 Device: None (Room air)    Physical Exam:   GEN: appears well, alert and oriented x 3, pleasant and cooperative   CV: Regular rate  RESP: non labored breathing  ABDOMEN: soft, no tenderness, no distention, Uterine fundus firm and non-tender, -1 cm below the umbilicus, incision c/d/i  EXTREMITIES: non-tender  NEURO Alert and oriented to person, place, and time.           Lab Results: I have reviewed the following results:  Lab Results   Component Value Date    WBC 11.32 (H) 07/10/2025    HGB 11.1 (L) 07/10/2025    HCT 33.9 (L) 07/10/2025    MCV 86 07/10/2025    PLT  322 07/10/2025     Maya Trevino, DO   Obstetrics & Gynecology PGY-III  07/12/25

## 2025-07-12 NOTE — LACTATION NOTE
This note was copied from a baby's chart.  CONSULT - LACTATION  Baby Boy Cuevas (Heather) 1 days male MRN: [id number]    formerly [facility] Room / Bed: (N)/(N) Encounter: [id number]    Maternal Information     MOTHER:  Camilla Cuevas  Maternal Age: 35 y.o.  OB History: # 1 - Date: , Sex: None, Weight: None, GA: 6w0d, Type: Spontaneous , Apgar1: None, Apgar5: None, Living: None, Birth Comments: None    # 2 - Date: 24, Sex: None, Weight: None, GA: 4w0d, Type: Spontaneous , Apgar1: None, Apgar5: None, Living:  Demise, Birth Comments: None    # 3 - Date: 25, Sex: Male, Weight: 3610 g (7 lb 15.3 oz), GA: 38w4d, Type: , Low Transverse, Apgar1: 8, Apgar5: 9, Living: Living, Birth Comments: None   Previous breast reduction surgery? No    Lactation history:   Has patient previously breast fed: No   How long had patient previously breast fed:     Previous breast feeding complications:       Past Surgical History:   Procedure Laterality Date    COLONOSCOPY W/ BIOPSIES  2018    benign polyp    DILATION AND CURETTAGE OF UTERUS  2024    WISDOM TOOTH EXTRACTION         Birth information:  YOB: 2024   Time of birth: 8:17 PM   Sex: male   Delivery type: , Low Transverse   Birth Weight: 3610 g (7 lb 15.3 oz)   Percent of Weight Change: 0%     Gestational Age: 38w4d   [unfilled]    Reason for Consult:    Reason for Consult: Initial assessment (routine) - 10 min    Risk Factors:         Breast and nipple assessment:   Breasts/Nipples  Left Breast: Soft  Right Breast: Soft  Left Nipple: Flat, Inverted  Right Nipple: Flat, Inverted  Intervention: Breast pump, Hand expression (nipple everter)  Breastfeeding Progress: Not yet established, Latch issues    OB Lactation Tools:    Other OB Lactation Tools  Feeding Devices: Syringe, Finger Feeding    Breast Pump:    Breast Pump  Pump: Manual, Personal, Electric  (Spectra)  Pump Review/Education: Setup, frequency, and cleaning  Initiated by: Reviewed manual pump use  Date Initiated: 25       Assessment: sleepy    Feeding assessment: latch difficulty (latch but no suck with hands on support)  LATCH:  Latch: Repeated attempts, hold nipple in mouth, stimulate to suck   Audible Swallowing: None   Type of Nipple: Inverted   Comfort (Breast/Nipple): Soft/non-tender   Hold (Positioning): Partial assist, teach one side, mother does other, staff holds   LATCH Score: 4       HazelBaker:  WNL                 Feeding recommendations:  breastfeed on demand at least every 2-3 hours; pump breasts if no latch, offer ebm/formula via syringe finger feed    Met with Camilla who is breastfeeding her baby boy and has been having difficulty waking baby and latching. Camilla and FOB are finger syringe feeding baby formula upon entry to the room. Once baby finished encouraged offering the breast. Camilla's nipple jack briefly short shank with the nipple everter, but quickly flatten/invert. Visible colostrum is expressed with the everter. Baby has a wide gape and with a firm breast compression, nipple and areola are placed in baby's mouth where he rested but did not create negative pressure before falling asleep. Encouraged keeping baby skin to skin. Recommended offering both breasts for at least 5 minutes and if baby does not latch, to pump/hand express colostrum to finger feed. Discussed formula handling if offering formula as top up. Reviewed typical breast milk output. Encouraged to call for lactation support as needed.       Filomena Germain MA 2025 6:23 PM

## 2025-07-13 VITALS
OXYGEN SATURATION: 96 % | TEMPERATURE: 98.5 F | SYSTOLIC BLOOD PRESSURE: 118 MMHG | DIASTOLIC BLOOD PRESSURE: 64 MMHG | WEIGHT: 176 LBS | HEIGHT: 66 IN | RESPIRATION RATE: 20 BRPM | HEART RATE: 80 BPM | BODY MASS INDEX: 28.28 KG/M2

## 2025-07-13 PROCEDURE — 99024 POSTOP FOLLOW-UP VISIT: CPT | Performed by: OBSTETRICS & GYNECOLOGY

## 2025-07-13 PROCEDURE — NC001 PR NO CHARGE: Performed by: OBSTETRICS & GYNECOLOGY

## 2025-07-13 RX ORDER — ENOXAPARIN SODIUM 100 MG/ML
40 INJECTION SUBCUTANEOUS
Status: DISCONTINUED | OUTPATIENT
Start: 2025-07-13 | End: 2025-07-13 | Stop reason: HOSPADM

## 2025-07-13 RX ORDER — ACETAMINOPHEN 325 MG/1
650 TABLET ORAL EVERY 6 HOURS SCHEDULED
Start: 2025-07-13 | End: 2025-07-15

## 2025-07-13 RX ORDER — CALCIUM CARBONATE 500 MG/1
1000 TABLET, CHEWABLE ORAL DAILY PRN
Start: 2025-07-13

## 2025-07-13 RX ORDER — OXYCODONE HYDROCHLORIDE 5 MG/1
5 TABLET ORAL EVERY 4 HOURS PRN
Qty: 8 TABLET | Refills: 0 | Status: SHIPPED | OUTPATIENT
Start: 2025-07-13 | End: 2025-07-15 | Stop reason: SDUPTHER

## 2025-07-13 RX ORDER — BENZOCAINE/MENTHOL 6 MG-10 MG
1 LOZENGE MUCOUS MEMBRANE DAILY PRN
Qty: 1.5 G | Refills: 0 | Status: SHIPPED | OUTPATIENT
Start: 2025-07-13

## 2025-07-13 RX ORDER — SIMETHICONE 80 MG
80 TABLET,CHEWABLE ORAL 4 TIMES DAILY PRN
Start: 2025-07-13

## 2025-07-13 RX ORDER — OXYCODONE HYDROCHLORIDE 5 MG/1
5 TABLET ORAL EVERY 4 HOURS PRN
Status: DISCONTINUED | OUTPATIENT
Start: 2025-07-13 | End: 2025-07-13 | Stop reason: HOSPADM

## 2025-07-13 RX ORDER — DIPHENHYDRAMINE HCL 25 MG
25 TABLET ORAL EVERY 6 HOURS PRN
Start: 2025-07-13

## 2025-07-13 RX ORDER — IBUPROFEN 600 MG/1
600 TABLET, FILM COATED ORAL EVERY 6 HOURS
Start: 2025-07-13

## 2025-07-13 RX ORDER — OXYCODONE HYDROCHLORIDE 10 MG/1
10 TABLET ORAL EVERY 4 HOURS PRN
Status: DISCONTINUED | OUTPATIENT
Start: 2025-07-13 | End: 2025-07-13 | Stop reason: HOSPADM

## 2025-07-13 RX ADMIN — IBUPROFEN 600 MG: 600 TABLET ORAL at 08:39

## 2025-07-13 RX ADMIN — DOCUSATE SODIUM 100 MG: 100 CAPSULE, LIQUID FILLED ORAL at 08:39

## 2025-07-13 RX ADMIN — OXYCODONE HYDROCHLORIDE 5 MG: 5 TABLET ORAL at 02:50

## 2025-07-13 RX ADMIN — ACETAMINOPHEN 650 MG: 325 TABLET ORAL at 06:08

## 2025-07-13 RX ADMIN — IBUPROFEN 600 MG: 600 TABLET ORAL at 02:38

## 2025-07-13 RX ADMIN — ACETAMINOPHEN 650 MG: 325 TABLET ORAL at 13:25

## 2025-07-13 NOTE — DISCHARGE INSTR - AVS FIRST PAGE
WHAT YOU NEED TO KNOW:   A , or  section, is abdominal surgery to deliver your baby.  DISCHARGE INSTRUCTIONS:   Call 911 for any of the following:   You feel lightheaded, short of breath, and have chest pain.     You cough up blood.  Seek care immediately if:   Blood soaks through your bandage.     Your stitches come apart.     Your arm or leg feels warm, tender, and painful. It may look swollen and red.  Contact your OB if:   You have heavy vaginal bleeding that fills 1 or more sanitary pads in 1 hour.    You have a fever.     Your incision is swollen, red, or draining pus.     You have questions or concerns about yourself or your baby.  Medicines:  You may  need any of the following:  Prescription pain medicine  may be given. Ask how to take this medicine safely.     Acetaminophen  decreases pain and fever. It is available without a doctor's order. Ask how much to take and how often to take it. Follow directions. Acetaminophen can cause liver damage if not taken correctly.    NSAIDs , such as ibuprofen, help decrease swelling, pain, and fever. NSAIDs can cause stomach bleeding or kidney problems in certain people. If you take blood thinner medicine, always ask your healthcare provider if NSAIDs are safe for you. Always read the medicine label and follow directions.    Take your medicine as directed.  Contact your healthcare provider if you think your medicine is not helping or if you have side effects. Tell him or her if you are allergic to any medicine. Keep a list of the medicines, vitamins, and herbs you take. Include the amounts, and when and why you take them. Bring the list or the pill bottles to follow-up visits. Carry your medicine list with you in case of an emergency.  Wound care:  Carefully wash your wound with soap and water every day. Keep your wound clean and dry. Wear loose, comfortable clothes that do not rub against your wound. Ask your OB about bathing and  showering.  Limit activity as directed:   Ask when it is safe for you to drive, walk up stairs, lift heavy objects, and have sex.     Ask when it is okay to exercise, and what types of exercise to do. Start slowly and do more as you get stronger.  Drink liquids as directed:  Liquids help keep you hydrated after your procedure and decrease your risk for a blood clot. Ask how much liquid to drink each day and which liquids are best for you.   Follow up with your OB as directed:  You may need to return to have your stitches or staples removed. Write down your questions so you remember to ask them during your visits.  © 2017 [employer] Information is for End User's use only and may not be sold, redistributed or otherwise used for commercial purposes. All illustrations and images included in CareNotes® are the copyrighted property of [employer]. or [employer].  The above information is an  only. It is not intended as medical advice for individual conditions or treatments. Talk to your doctor, nurse or pharmacist before following any medical regimen to see if it is safe and effective for you.      Postpartum Perineal Care   WHAT YOU NEED TO KNOW:   Postpartum perineal care is care for your perineum after you have a baby. The perineum is your vagina and anus.   DISCHARGE INSTRUCTIONS:   Care for your perineum:  Healthcare providers will give you a small squirt bottle and show you how to use it. Do the following after you use the toilet and before you put on a new pad:  Remove the soiled pad    Use the squirt bottle to rinse your perineum from front to back while you sit on the toilet     Pat the area dry from front to back with toilet paper or a cotton cloth     Put on a fresh pad    Wash your hands  Decrease pain:  Ask your healthcare provider about these and other ways to decrease perineal pain:  Sitz baths:  Healthcare providers may give you a portable sitz bath. This is a  small tub that fits in the toilet. Fill the sitz bath or bathtub with 4 to 6 inches of warm water. Sit in the warm water for 20 minutes 2 to 3 times a day.    Ice:  Ice helps decrease swelling and pain. Ice may also help prevent tissue damage. Use an ice pack, or put crushed ice in a plastic bag. Cover it with a towel and place it on your perineum for 15 to 20 minutes every hour, or as directed.    Medicine spray, wipes, or pads:  Healthcare providers may give you a medicine spray or wipes soaked with numbing medicine to decrease the pain. Pads that contain an herb called witch hazel may also help reduce pain. Use these after perineal care or a sitz bath.  Follow up with your healthcare provider as directed:  Write down your questions so you remember to ask them during your visits.   Contact your healthcare provider if:   You have heavy vaginal bleeding that fills 1 or more sanitary pads in 1 hour.    You have foul-smelling vaginal discharge.    You feel weak or lightheaded.    You have questions or concerns about your condition or care.  Seek care immediately or call 911 if:   You have large blood clots or bright red blood coming from your vagina.    You have abdominal pain, vomiting, and a fever.  © 2017 [employer] Information is for End User's use only and may not be sold, redistributed or otherwise used for commercial purposes. All illustrations and images included in CareNotes® are the copyrighted property of [employer]. or "[employer].  The above information is an  only. It is not intended as medical advice for individual conditions or treatments. Talk to your doctor, nurse or pharmacist before following any medical regimen to see if it is safe and effective for you.      Postpartum Depression   WHAT YOU NEED TO KNOW:   What is postpartum depression?  Postpartum depression is a mood disorder that occurs after giving birth. A mood is an emotion or a feeling. Moods  affect your behavior and how you feel about yourself and life in general. Depression is a sad mood that you cannot control. Women often feel sad, afraid, or nervous after their baby is born. These feelings are called postpartum blues or baby blues, and they usually go away in 1 to 2 weeks. With postpartum depression, these symptoms get worse and continue for more than 2 weeks. Postpartum depression is a serious condition that affects your daily activities and relationships.   What causes postpartum depression?  Healthcare providers do not know exactly what causes postpartum depression. It may be caused by a sudden drop in hormone levels after childbirth. A previous episode of postpartum depression or a family history of depression may increase your risk. Several things may trigger postpartum depression:  Lack of support from the baby's father or other family members    Feeling more tired than usual    Stress, a poor diet, or lack of sleep    Pain after childbirth or pain during breastfeeding    Sudden change in lifestyle  How is postpartum depression diagnosed?  Postpartum depression affects your daily activities and your relationships with other people. Healthcare providers will ask you questions about your signs and symptoms and how they are affecting your life. The symptoms of postpartum depression usually begin within 1 month after childbirth. You feel depressed or lose interest in activities you enjoy nearly every day for at least 2 weeks. You also have 4 or more of the following symptoms:  You feel tired or have less energy than usual.     You feel unimportant or guilty most of the time.    You think about hurting or killing yourself.    Your appetite changes. You may lose your appetite and lose weight without trying. Your appetite may also increase and you may gain weight.    You are restless, irritable, or withdrawn.    You have trouble concentrating and remembering things. You have trouble doing daily tasks  or making decisions.    You have trouble sleeping, even after the baby is asleep.  How is postpartum depression treated?   Psychotherapy:  During therapy, you will talk with healthcare providers about how to cope with your feelings and moods. This can be done alone or in a group. It may also be done with family members or your partner.     Antidepressants:  This medicine is given to decrease or stop the symptoms of depression. You usually need to take antidepressants for several weeks before you begin to feel better. Do not stop taking antidepressants unless your healthcare provider tells you to. Healthcare providers may try a different antidepressant if one type does not work.  What can I do to feel better?   Rest:  Do not try to do everything all at the same time. Do only what is needed and let other things wait until later. Ask your family or friends for help, especially if you have other children. Ask your partner to help with night feedings or other baby care. Try to sleep when the baby naps.     Get emotional support:  Share your feelings with your partner, a friend, or another mother.     Take care of yourself:  Shower and dress each day. Do not skip meals. Try to get out of the house a little each day. Get regular exercise. Eat a healthy diet. Avoid alcohol because it can make your depression worse. Do not isolate yourself. Go for a walk or meet with a friend. It is also important that you have some time by yourself each day.  How do I find support and more information?   [employer] [place] [facility] ([facility]), Public Information & Communication Branch  [address] [place] [address], Room 8184, [facility] [address]  [place] [address]   Phone: [phone]  Phone: [phone]  Web Address: http://www.[facility].nih.gov/  When should I contact my healthcare provider?   You cannot make it to your next visit.    Your depression does not get better with treatment or it gets worse.     You have questions or  concerns about your condition or care.  When should I seek immediate care or call 911?   You think about hurting or killing yourself, your baby, or someone else.    You feel like other people want to hurt you.     You hear voices telling you to hurt yourself or your baby.  CARE AGREEMENT:   You have the right to help plan your care. Learn about your health condition and how it may be treated. Discuss treatment options with your caregivers to decide what care you want to receive. You always have the right to refuse treatment. The above information is an  only. It is not intended as medical advice for individual conditions or treatments. Talk to your doctor, nurse or pharmacist before following any medical regimen to see if it is safe and effective for you.  ©  [employer] Information is for End User's use only and may not be sold, redistributed or otherwise used for commercial purposes. All illustrations and images included in CareNotes® are the copyrighted property of [employer]. or [employer].      Postpartum Bleeding   WHAT YOU NEED TO KNOW:   Postpartum bleeding is vaginal bleeding after childbirth. This bleeding is normal, whether your baby was born vaginally or by . It contains blood and the tissue that lined the inside of your uterus when you were pregnant.   DISCHARGE INSTRUCTIONS:   What to expect with postpartum bleeding:  Postpartum bleeding usually lasts at least 10 days, and may last longer than 6 weeks. Your bleeding may range from light (barely staining a pad) to heavy (soaking a pad in 1 hour). Usually, you have heavier bleeding right after childbirth, which slows over the next few weeks until it stops. The bleeding is red or dark brown with clots for the first 1 to 3 days. It then turns pink for several days, and then becomes a white or yellow discharge until it ends.  Follow up with your obstetrician as directed:  Do not have sex until your  obstetrician says it is okay. Write down your questions so you remember to ask them during your visits.  Contact your healthcare provider or obstetrician if:   Your bleeding increases, or you have heavy bleeding that soaks a pad in 1 hour for 2 hours in a row.    You pass large blood clots.    You are breathing faster than normal, or your heart is beating faster than normal.    You are urinating less than usual, or not at all.    You feel dizzy.    You have questions or concerns about your condition or care.  Seek immediate care or call 911 if:   You are suddenly short of breath and feel lightheaded.    You have sudden chest pain.  © 2017 [employer] Information is for End User's use only and may not be sold, redistributed or otherwise used for commercial purposes. All illustrations and images included in CareNotes® are the copyrighted property of [employer]. or "[employer]  The above information is an  only. It is not intended as medical advice for individual conditions or treatments. Talk to your doctor, nurse or pharmacist before following any medical regimen to see if it is safe and effective for you.      Breast Care for the Breast Feeding Mother   WHAT YOU SHOULD KNOW:   Your breasts will go through normal changes while you are breastfeeding. Sometimes breast and nipple problems can develop while you are breastfeeding. Learn about changes that are normal and those that may be a problem. Breast care can help you prevent and manage problems so you and your baby can enjoy the benefits of breastfeeding.  AFTER YOU LEAVE:   Breast changes while you are breastfeeding:   For the first few days after your baby is born, your body makes a small amount of breast milk (colostrum). Within about 2 to 5 days, your body will begin making mature milk. It may take up to 10 days or longer for mature milk to come in. When your mature milk comes in, your breasts will become full and  firm. They may feel tender.     Breastfeeding your baby will decrease the full feeling in your breasts. You may feel a tingly sensation during feedings as milk is released from your breasts. This is called the milk let-down reflex. After 7 or more days, the fullness may feel like it has decreased. Your nipples should look the same as they did before you started breastfeeding. Breasts that feel full before and empty after breastfeeding are signs that breastfeeding is going well.  Breast problems that can occur while you are breastfeeding:   Nipple soreness  may occur when you begin to breastfeed your baby. You may also have nipple soreness if your baby is not latched on to your breast correctly. Correct positioning and latch-on may decrease or stop the pain in your nipples. Work with your caregivers to help your baby latch on correctly. It may also be helpful to place warm, wet compresses on your nipples to help decrease pain.     Plugged milk ducts  may cause painful breast lumps. Plugged ducts may be caused by not emptying your breasts completely during feedings. When your baby pauses during breastfeeding, massage and gently squeeze your breast. Gentle massage may unplug a blocked milk duct. Pump out any milk left in your breasts after your baby is done breastfeeding. Avoid wearing tight tops, tight bras, or under-wire bras, because they may put pressure on your breasts.    Engorgement  may occur as your milk comes in soon after you begin breastfeeding. Engorgement may cause your breasts to become swollen and painful. Your breasts may also become engorged if you miss a feeding or you do not breastfeed on demand. The best way to decrease engorgement symptoms is to empty your breasts by feeding your baby often. Engorgement can make it hard for your baby to latch on to your breast. If this happens, express a small amount of milk and then have your baby latch on. Cold compresses, gel packs, or ice packs on your breasts  can help decrease pain and swelling. Ask your caregiver how often and how long you should use cold, or ice packs.     A breast infection called mastitis  can develop if you have plugged milk ducts or engorgement. Mastitis causes your breasts to become red, swollen, and painful. You may also have flu-like symptoms, such as chills and a fever. Place heat on your breasts to help decrease the pain. You may want to place a moist, warm cloth on the painful breast or both of your breasts. Ask how often to do this. Your primary healthcare provider (PHP) may suggest that you take an NSAID, such as ibuprofen, to decrease pain and swelling. He may also order antibiotics to treat mastitis. Ask about feeding your baby when you have a breast infection.  How to help prevent or manage breast problems while you are breastfeeding:   Learn how to position your baby and latch him on correctly.  To latch your baby correctly to your breast, make sure that his mouth covers most of your areola (dark area around your nipple). He should not be attached only to the nipple. Your baby is latched on well if you feel comfortable and do not feel pain. A correct latch helps him get enough milk and can help to prevent sore nipples and other breast problems. There are several breastfeeding positions that you can try. Find the position that works best for you and your baby. Ask your caregiver for more information about how to hold and breastfeed your baby.     Prevent biting.  Your baby may get teeth at about 3 to 4 months of age. To help prevent biting, break his suction once he is finished or if he has fallen asleep. To break his suction, slip a finger into the side of his mouth. If your baby bites you, respond with surprise or unhappiness. Offer praise when he does not bite you.     Breastfeed your baby regularly.  Feed your baby 8 to 12 times a day. You may need to wake up your baby at night to feed him. It is okay to feed from 1 or both breasts  at each feeding. Your baby should breastfeed from both breasts equally over the course of a day. If your baby only feeds from 1 side during a feeding, offer your other breast to him first for the next feeding.     Schedule and keep follow-up visits.  Talk to your baby's pediatrician or your PHP during follow-up visits if you have breast problems. Caregivers may suggest that you, or you and your partner, attend classes on breastfeeding. You also may want to join a breastfeeding support group. Caregivers may suggest that you see a lactation consultant. This is a caregiver who can help you with breastfeeding.  Contact your PHP if:   You have a fever and chills.    You have body aches and you feel like you do not have any energy.    One or both of your breasts is red, swollen or hard, painful, and feels warm or hot.    You have breast engorgement that does not get better within 24 hours.     You see or feel a lump in your breast that hurts when you touch it.    You have nipple pain during breastfeeding or between feedings.     Your nipples are red, dry, cracked, or bleeding, or they have scabs on them.     You have questions or concerns about your condition or care.  © 2014 [employer] Information is for End User's use only and may not be sold, redistributed or otherwise used for commercial purposes. All illustrations and images included in CareNotes® are the copyrighted property of [employer]. or [employer].  The above information is an  only. It is not intended as medical advice for individual conditions or treatments. Talk to your doctor, nurse or pharmacist before following any medical regimen to see if it is safe and effective for you.    Postpartum Birth Control Options   Almost half of all pregnancies are not planned, which can sometimes be a happy surprise, but other times can be a stressful situation for a woman or family. Birth control can be an empowering tool  for women to take control of their family planning so that they can have healthy pregnancies in the future if and when they want. We recommend at least 6 months, ideally 18 months, between delivery to conception of the next pregnancy.      Implant  The birth control implant, brand name Nexplanon, is a small matchstick sized device that is placed under the skin of your upper arm. This releases a small amount of hormone daily that prevents pregnancy for up to 3 years of use. You can have the implant inserted after delivery prior to being discharged from the hospital or at any time in your OBGYN office depending on insurance coverage.  Pros  - Less than 1 out of 100 women will get pregnant in 1 year using this method  - Once it is placed you do not have to do anything else to prevent pregnancy, like remembering to take a daily pill   - Once removed, the implant is immediately reversible and you will return to your normal ability to get pregnant  - While there is a theoretical risk of low milk supply when these methods are started right after birth, there is no research to support this theory. The implant is considered safe for use in breastfeeding mothers  Cons  - Most common side effects of the implant include changes in your period- which can be heavier, lighter, spotting in between periods or stopping your periods all together- it depends on the person  - Other side effects include headaches and acne     Intrauterine Device (IUD)  An intrauterine device (IUD) is a small T shaped device that is placed into the uterus. The IUD comes in 2 forms: hormonal and non-hormonal  - The non-hormonal IUD or copper IUD has been approved up to 10 years of use   - The hormonal IUD comes in forms that are approved for 3-6 years of use    This device can be placed immediately following delivery, 4 weeks after delivery or any time following that in your OBGYN office depending on insurance coverage.  Pros  - Less than 1 out of 100  women will get pregnant in 1 year using this method  - Once it is placed you do not have to do anything else to not get pregnant, like remembering to take a daily pill  - Many women have lighter periods or no periods at all on the hormonal IUD.   - Once removed, the IUD is immediately reversible and you will return to your normal ability to get pregnant  - While there is a theoretical risk of low milk supply when these methods are started right after birth, there is no research to support this theory. The IUD is considered safe for use in breastfeeding mothers  Cons  - Possibility of IUD falling out of the uterus               - Occurs in approximately 2-5% of women   - If your IUD is placed immediately after delivery the risk of it falling out is slightly higher at 10-27%  - Hormonal IUD can result in irregular spotting in the first 3-6 months that usually normalizes  - Non-hormonal IUD may cause heavier and more crampy periods in the first few months of use, which tends to improve after the first year of use  - Other side effects of the hormonal IUD include headaches and acne     Birth Control Shot  The birth control shot, brand name Depo Provera, is given every 3 months to prevent pregnancy. This injection can be received before being discharged from the hospital, following the delivery of your child or at any time at your OBGYN office.   Pros  - About 4 out of 100 women will get pregnant in 1 year using this method  - You do not need to remember to take a daily pill but you do need to remember to get the injection at your OBGYN office every 3 months  - While there is a theoretical risk of low milk supply when this birth control are started right after birth, there is no research to support this theory. The birth control shot is considered safe for use in breastfeeding mothers.  Cons  - Most common side effect of the birth control injection is irregular bleeding; this usually normalizes after 1 year of use  -  "After your last shot, sometimes your ability to get pregnant can be delayed by as long as 1 year  - Weight gain, headaches, and mood changes are side effects that some women may experience  - Decreased bone mineral density can occur with long-term use; however, this is reversible once you stop using the injection and does not increase your lifetime risk of osteoporosis     Progesterone-Only Pills  The \"mini pill\" is a pill that must be taken once daily at the same time each day. This can be started as soon as you go home from the hospital after delivery, or at any time.  Pros  - About 8 out of 100 women will get pregnant in 1 year using this method  - You have complete control of when to start and stop taking the pill  - Many women have lighter periods or no periods at all while taking this pill  - While there is a theoretical risk of low milk supply when this birth control are started right after birth, there is no research to support this theory. The progesterone only pill is considered safe for use in breastfeeding mothers  Cons  - You must take the pill at the same time every day. If you miss one day, you need another form of pregnancy prevention for at least 7 days  - Some people have irregular, unscheduled bleeding while taking the pill  - Other side effects include headaches and acne     Combined Hormonal Contraceptives (Pill, Patch, Ring)  These methods all contain 2 hormones, estrogen and progesterone. The pill is taken once daily, the patch is worn on the skin and changed once weekly, the ring is placed vaginally and changed once monthly. These methods can be started 4-6 weeks after giving birth.  Pros  - About 8 out of 100 women will get pregnant in 1 year using this method  - You have complete control of when to start and stop using these methods  - Many women have lighter, less painful, regular periods while taking this pill  Cons  - Recommend against use while breastfeeding as estrogen can decrease " milk supply  - You must take the pill at the same time every day. If you miss one day, you need another form of pregnancy prevention for at least 7 days     Male/Female condoms, withdrawal, fertility awareness  These are methods that you either buy over the counter or do yourself. Condoms and withdrawal must be used with every sexual act. Fertility awareness must be assessed every day. Condoms and withdrawal can be used immediately after delivery once your doctor has performed your postpartum office exam. Fertility awareness can be started after return of your period.  Pros  - You have complete control of when to start and stop using these methods  - No issue with use and breastfeeding  Cons  - About 15-25 out of 100 women will get pregnant in 1 year using this method  - Condoms and withdrawal must be used with every sexual act  - Fertility awareness must be assessed every day

## 2025-07-13 NOTE — ASSESSMENT & PLAN NOTE
Patient on Lovenox at home  Last dose 7/9 at 2130  Held during labor   According to anesthesia documentation: Will plan to administer first Enoxaparin dose AFTER delivery; NO SOONER than 1pm 7/11/25; and AT LEAST 4 hrs after removal of epidural catheter.  Restarted 7/12 at 9A, changed to 9P 7/13 per pt request

## 2025-07-13 NOTE — CONSULTS
This note was copied from a baby's chart.  CONSULT - LACTATION  Baby Boy Cuevas (Heather) 2 days male MRN: [id number]    [facility] Room / Bed: (N)/(N) Encounter: [id number]    Maternal Information     MOTHER:  Camilla Cuevas  Maternal Age: 35 y.o.  OB History: # 1 - Date: , Sex: None, Weight: None, GA: 6w0d, Type: Spontaneous , Apgar1: None, Apgar5: None, Living: None, Birth Comments: None    # 2 - Date: 24, Sex: None, Weight: None, GA: 4w0d, Type: Spontaneous , Apgar1: None, Apgar5: None, Living:  Demise, Birth Comments: None    # 3 - Date: 25, Sex: Male, Weight: 3610 g (7 lb 15.3 oz), GA: 38w4d, Type: , Low Transverse, Apgar1: 8, Apgar5: 9, Living: Living, Birth Comments: None   Previous breast reduction surgery? No    Lactation history:   Has patient previously breast fed: No   How long had patient previously breast fed:     Previous breast feeding complications:       Past Surgical History:   Procedure Laterality Date    COLONOSCOPY W/ BIOPSIES  2018    benign polyp    DILATION AND CURETTAGE OF UTERUS  2024    WISDOM TOOTH EXTRACTION         Birth information:  YOB: 2024   Time of birth: 8:17 PM   Sex: male   Delivery type: , Low Transverse   Birth Weight: 3610 g (7 lb 15.3 oz)   Percent of Weight Change: -6%     Gestational Age: 38w4d   [unfilled]    Reason for Consult:    Reason for Consult: Discharge (routine) - 10 min    Risk Factors:         Breast and nipple assessment:   Breasts/Nipples  Left Breast: Soft  Right Breast: Soft  Left Nipple: Flat, Everted, Tender (short shank)  Right Nipple: Flat, Tender, Everted, Cracked  Intervention: Lanolin, Breast pump, Breast shells  Breastfeeding Progress: Not yet established  Reasons for not Breastfeeding: Maternal preference    OB Lactation Tools:    Other OB Lactation Tools  Feeding Devices: Pump, Finger Feeding, Syringe,  Bottle    Breast Pump:    Breast Pump  Pump: Personal, Electric, Manual  Pump Review/Education: Setup, frequency, and cleaning  Initiated by: Reviewed by Filomena Germain  Date Initiated: 25      [place] Assessment: sleeping on FOB    Feeding assessment: latch difficulty (tender nipples, flat/inverted now flat/everted short shank; supp EBM and formula via syringe for first week; ed on paced bottle)      Feeding recommendations:  breastfeed on demand, latching several times of day; pump if no latch or not offering breast; offer expressed milk and formula via syringe; as baby increases quantity of milk/formula, offer paced bottle    Met with Camilla who is breastfeeding her baby boy and is anticipating discharge home today. Camilla's nipples have everted short shank since yesterday. She reports nipple tenderness and discomfort with latching every feed. She latched this morning and have been pumping each feed since. Encouraged offering her breasts at least several times a day. Discussed initial latch on pain that subsides within a minute. Encouraged pumping breast to offer expressed milk or when offering formula. Discussed increasing amounts of colostrum/formula each day. Discussed syringe feeding within the first week, but as baby's intake increases, paced bottle feeding techniques. Encouraged continuing to track diaper out and weight loss. Reviewed breast engorgement and breast care. Discussed softening the nipple/areola to make latching easier during this time. Discussed lanolin and telfa pads for nipple healing. Recommended outpatient follow up at [facility].     Filomena Germain MA 2025 2:39 PM

## 2025-07-13 NOTE — PROGRESS NOTES
Progress Note - OB/GYN   Name: Camilla Cuevas 35 y.o. female I MRN: [id number]  Unit/Bed#: -01 I Date of Admission: 7/10/2025   Date of Service: 2025 I Hospital Day: 3     Assessment & Plan  S/P primary low transverse   Routine postpartum care  Encourage ambulation  Encourage familial bonding  Lactation support as needed  Pain: Motrin/Tylenol around the clock, oxycodone if needed  Pre-delivery Hgb 11.1 --> Post Delivery f/u am cbc  Price catheter: in, for VT   DVT Ppx: ambulation, SCDs, Lovenox 40mg for APLS    Antiphospholipid syndrome during pregnancy (HCC)  Patient on Lovenox at home  Last dose  at 2130  Held during labor   According to anesthesia documentation: Will plan to administer first Enoxaparin dose AFTER delivery; NO SOONER than 1pm 25; and AT LEAST 4 hrs after removal of epidural catheter.  Restarted  at 9A, changed to 9P  per pt request    OB Post-Partum Progress Note  Subjective   Post delivery. Patient is doing well. Lochia WNL. Pain well controlled. She notes that she has been having 7/10 pain in her pelvis and vagina. She states that tylenol and motrin help some but she required Kellen 5 to sleep. She would like to go home today, is hopeful that she can manage her pain at home.    Pain: yes, cramping, improved with meds  Tolerating PO: yes  Voiding: yes  Flatus: yes  BM: no  Ambulating: yes  Breastfeeding:  yes  Chest pain: no  Shortness of breath: no  Leg pain: no  Lochia: WNL    Objective :  Temp:  [97.6 °F (36.4 °C)-98.4 °F (36.9 °C)] 98.4 °F (36.9 °C)  HR:  [68-92] 91  BP: (102-130)/(52-84) 102/52  Resp:  [17-20] 18  SpO2:  [96 %-99 %] 96 %  O2 Device: None (Room air)    Physical Exam  Physical Exam:   GEN: appears well, alert and oriented x 3, pleasant and cooperative   CV: Regular rate  RESP: non labored breathing  ABDOMEN: soft, no tenderness, no distention, Uterine fundus firm and non-tender, -2 cm below the umbilicus, incision c/d/i  EXTREMITIES:  non-tender  NEURO Alert and oriented to person, place, and time.       Lab Results: I have reviewed the following results:  Lab Results   Component Value Date    WBC 23.93 (H) 07/12/2025    HGB 8.5 (L) 07/12/2025    HCT 26.6 (L) 07/12/2025    MCV 88 07/12/2025     07/12/2025     Bren Moore MD  (they/them)  Obstetrics & Gynecology PGY-1  7/13/2025  6:27 AM

## 2025-07-13 NOTE — LACTATION NOTE
This note was copied from a baby's chart.  CONSULT - LACTATION  Baby Boy Cuevas (Heather) 2 days male MRN: [id number]    [facility] AN NURSERY Room / Bed: (N)/(N) Encounter: [id number]    Maternal Information     MOTHER:  Camilla Cuevas  Maternal Age: 35 y.o.  OB History: # 1 - Date: , Sex: None, Weight: None, GA: 6w0d, Type: Spontaneous , Apgar1: None, Apgar5: None, Living: None, Birth Comments: None    # 2 - Date: 24, Sex: None, Weight: None, GA: 4w0d, Type: Spontaneous , Apgar1: None, Apgar5: None, Living:  Demise, Birth Comments: None    # 3 - Date: 25, Sex: Male, Weight: 3610 g (7 lb 15.3 oz), GA: 38w4d, Type: , Low Transverse, Apgar1: 8, Apgar5: 9, Living: Living, Birth Comments: None   Previous breast reduction surgery? No    Lactation history:   Has patient previously breast fed: No   How long had patient previously breast fed:     Previous breast feeding complications:       Past Surgical History:   Procedure Laterality Date    COLONOSCOPY W/ BIOPSIES  2018    benign polyp    DILATION AND CURETTAGE OF UTERUS  2024    WISDOM TOOTH EXTRACTION         Birth information:  YOB: 2024   Time of birth: 8:17 PM   Sex: male   Delivery type: , Low Transverse   Birth Weight: 3610 g (7 lb 15.3 oz)   Percent of Weight Change: -6%     Gestational Age: 38w4d   [unfilled]    Reason for Consult:    Reason for Consult: Follow-up assessment (routine) -10 min, Breast/Nipple Pain - 5 min    Risk Factors:         Breast and nipple assessment:   Breasts/Nipples  Left Breast: Soft, Firm  Right Breast: Soft, Firm  Left Nipple: Flat, Inverted, Tender  Right Nipple: Flat, Inverted, Tender  Intervention: Lanolin, Breast pump  Breastfeeding Progress: Not yet established  Reasons for not Breastfeeding: Maternal preference    OB Lactation Tools:    Other OB Lactation Tools  Feeding Devices: Syringe, Finger  Feeding    Breast Pump:    Breast Pump  Pump: Personal, Electric, Manual  Pump Review/Education: Setup, frequency, and cleaning  Initiated by: Reviewed by Filomena Germain  Date Initiated: 25      [place] Assessment: finger feeding, alert    Feeding assessment: finger feeding upon entry       Feeding recommendations:  breastfeed on demand; if no latch, pump and finger feed expressed colostrum, top with formula    Met with Camilla who is breastfeeding her baby boy and supplementing with ebm and  formula via finger syringe feed if baby does not latch or suckle. Camilla and baby are anticipating discharge home today. Camilla reports that baby has been latching on both breasts and suckling, though he does appear hungry after feeds. She reports tender nipples but no damage. She has been using nipple balm and states she has silverettes. Educated on use of silverettes and risk with incorrect or over use. She and FOB are currently finger feeding formula; she had previously manually pumped 1ml and provided for baby. Baby appears more alert today. Discussed possible use of SNS to keep baby suckling longer at breast; encouraged parents to call at next feed for feeding support.     Recommended outpatient follow up at the [facility].     Filomena Germain MA 2025 10:11 AM

## 2025-07-13 NOTE — PLAN OF CARE
Problem: PAIN - ADULT  Goal: Verbalizes/displays adequate comfort level or baseline comfort level  Description: Interventions:  - Encourage patient to monitor pain and request assistance  - Assess pain using appropriate pain scale  - Administer analgesics as ordered based on type and severity of pain and evaluate response  - Implement non-pharmacological measures as appropriate and evaluate response  - Consider cultural and social influences on pain and pain management  - Notify physician/advanced practitioner if interventions unsuccessful or patient reports new pain  - Educate patient/family on pain management process including their role and importance of  reporting pain   - Provide non-pharmacologic/complimentary pain relief interventions  Outcome: Progressing     Problem: INFECTION - ADULT  Goal: Absence or prevention of progression during hospitalization  Description: INTERVENTIONS:  - Assess and monitor for signs and symptoms of infection  - Monitor lab/diagnostic results  - Monitor all insertion sites, i.e. indwelling lines, tubes, and drains  - Monitor endotracheal if appropriate and nasal secretions for changes in amount and color  - [place] appropriate cooling/warming therapies per order  - Administer medications as ordered  - Instruct and encourage patient and family to use good hand hygiene technique  - Identify and instruct in appropriate isolation precautions for identified infection/condition  Outcome: Progressing  Goal: Absence of fever/infection during neutropenic period  Description: INTERVENTIONS:  - Monitor WBC  - Perform strict hand hygiene  - Limit to healthy visitors only  - No plants, dried, fresh or silk flowers with hernandez in patient room  Outcome: Progressing     Problem: SAFETY ADULT  Goal: Patient will remain free of falls  Description: INTERVENTIONS:  - Educate patient/family on patient safety including physical limitations  - Instruct patient to call for assistance with activity   -  Consider consulting OT/PT to assist with strengthening/mobility based on AM PAC & JH-HLM score  - Consult OT/PT to assist with strengthening/mobility   - Keep Call bell within reach  - Keep bed low and locked with side rails adjusted as appropriate  - Keep care items and personal belongings within reach  - Initiate and maintain comfort rounds  - Make Fall Risk Sign visible to staff  - Apply yellow socks and bracelet for high fall risk patients  - Consider moving patient to room near nurses station  Outcome: Progressing  Goal: Maintain or return to baseline ADL function  Description: INTERVENTIONS:  -  Assess patient's ability to carry out ADLs; assess patient's baseline for ADL function and identify physical deficits which impact ability to perform ADLs (bathing, care of mouth/teeth, toileting, grooming, dressing, etc.)  - Assess/evaluate cause of self-care deficits   - Assess range of motion  - Assess patient's mobility; develop plan if impaired  - Assess patient's need for assistive devices and provide as appropriate  - Encourage maximum independence but intervene and supervise when necessary  - Involve family in performance of ADLs  - Assess for home care needs following discharge   - Consider OT consult to assist with ADL evaluation and planning for discharge  - Provide patient education as appropriate  - Monitor functional capacity and physical performance, use of AM PAC & JH-HLM   - Monitor gait, balance and fatigue with ambulation    Outcome: Progressing  Goal: Maintains/Returns to pre admission functional level  Description: INTERVENTIONS:  - Perform AM-PAC 6 Click Basic Mobility/ Daily Activity assessment daily.  - Set and communicate daily mobility goal to care team and patient/family/caregiver.   - Collaborate with rehabilitation services on mobility goals if consulted  - Out of bed for toileting  - Record patient progress and toleration of activity level   Outcome: Progressing     Problem: DISCHARGE  PLANNING  Goal: Discharge to home or other facility with appropriate resources  Description: INTERVENTIONS:  - Identify barriers to discharge w/patient and caregiver  - Arrange for needed discharge resources and transportation as appropriate  - Identify discharge learning needs (meds, wound care, etc.)  - Arrange for interpretive services to assist at discharge as needed  - Refer to Case Management Department for coordinating discharge planning if the patient needs post-hospital services based on physician/advanced practitioner order or complex needs related to functional status, cognitive ability, or social support system  Outcome: Progressing     Problem: POSTPARTUM  Goal: Experiences normal postpartum course  Description: INTERVENTIONS:  - Monitor maternal vital signs  - Assess uterine involution and lochia  Outcome: Progressing  Goal: Appropriate maternal -  bonding  Description: INTERVENTIONS:  - Identify family support  - Assess for appropriate maternal/infant bonding   -Encourage maternal/infant bonding opportunities  - Referral to  or  as needed  Outcome: Progressing  Goal: Establishment of infant feeding pattern  Description: INTERVENTIONS:  - Assess breast/bottle feeding  - Refer to lactation as needed  Outcome: Progressing  Goal: Incision(s), wounds(s) or drain site(s) healing without S/S of infection  Description: INTERVENTIONS  - Assess and document dressing, incision, wound bed, drain sites and surrounding tissue  - Provide patient and family education  Outcome: Progressing

## 2025-07-14 NOTE — UTILIZATION REVIEW
"NOTIFICATION OF INPATIENT ADMISSION   MATERNITY/DELIVERY AUTHORIZATION REQUEST   SERVICING FACILITY:   [facility]  Parent Child Health - L&D, [place], NICU  [address] [place] [address]  Tax ID: [id number]  NPI: [id number]   ATTENDING PROVIDER:  Attending Name and NPI#: Tatyana Ram Do [[id number]]  Address: [address] [place] [address]  Phone: [phone]   ADMISSION INFORMATION:  Place of Service: Inpatient [facility]  Place of Service Code: 21  Inpatient Admission Date/Time: 7/10/25 10:08 PM  Discharge Date/Time: 2025  3:40 PM  Admitting Diagnosis Code/Description:  Amniotic fluid leaking [O42.90]  38 weeks gestation of pregnancy [Z3A.38]  Encounter for  delivery without indication [O82]     Mother: Camilla Delgado 1990 Estimated Date of Delivery: 25  Delivering clinician: Tatyana Ram   OB History          3    Para   1    Term   1            AB   2    Living   1         SAB   2    IAB        Ectopic        Multiple   0    Live Births   1           Obstetric Comments   Menarche 14              [place] Name & MRN:   Information for the patient's :  Darwin Delgado Jr. [[id number]]   [place] Delivery Information:  Sex: male  Delivered 2025 8:17 PM by , Low Transverse; Gestational Age: 38w4d     Measurements:  Weight: 7 lb 15.3 oz (3610 g);  Height: 20\"    APGAR 1 minute 5 minutes 10 minutes   Totals: 8 9       UTILIZATION REVIEW CONTACT:  [place] Utilization   Network Utilization Review Department  Phone: [phone]  Fax [phone]  Email: [email or web address]  Contact for approvals/pending authorizations, clinical reviews, and discharge.     PHYSICIAN ADVISORY SERVICES:  Medical Necessity Denial & Oozm-xq-Iybp Review  Phone: [phone]  Fax: [phone]  Email: [email or web address]     DISCHARGE SUPPORT TEAM:  For Patients " Discharge Needs & Updates  Phone: [phone] opt. 2 Fax: [phone]  Email: [email or web address]        NOTIFICATION OF ADMISSION DISCHARGE   This is a Notification of Discharge from [facility]. Please be advised that this patient has been discharge from our facility. Below you will find the admission and discharge date and time including the patient’s disposition.   UTILIZATION REVIEW CONTACT:  Utilization Review Assistants  Network Utilization Review Department  Phone: [phone] x carefully listen to the prompts. All voicemails are confidential.  Email: [email or web address]     ADMISSION INFORMATION  PRESENTATION DATE: 7/10/2025  8:47 PM  OBERVATION ADMISSION DATE: N/A  INPATIENT ADMISSION DATE: 7/10/25 10:08 PM   DISCHARGE DATE: 7/13/2025  3:40 PM   DISPOSITION:Home/Self Care    Network Utilization Review Department  ATTENTION: Please call with any questions or concerns to [phone] and carefully listen to the prompts so that you are directed to the right person. All voicemails are confidential.   For Discharge needs, contact Care Management DC Support Team at [phone] opt. 2  Send all requests for admission clinical reviews, approved or denied determinations and any other requests to dedicated fax number below belonging to the campus where the patient is receiving treatment. List of dedicated fax numbers for the Facilities:  FACILITY NAME UR FAX NUMBER   ADMISSION DENIALS (Administrative/Medical Necessity) [phone]   DISCHARGE SUPPORT TEAM ([facility]) [phone]   PARENT CHILD HEALTH (Maternity/NICU/Pediatrics) [phone]   [facility] [phone]   [facility] [phone]   [facility] [phone]   [facility] [phone]   [facility] [phone]   [facility] [phone]   [facility]  [facility] [phone]   [facility] [phone]   [facility] [phone]   [facility] [phone]   [facility] [phone]   [facility] [phone]

## 2025-07-15 DIAGNOSIS — Z98.891 S/P PRIMARY LOW TRANSVERSE C-SECTION: ICD-10-CM

## 2025-07-15 RX ORDER — OXYCODONE HYDROCHLORIDE 5 MG/1
5 TABLET ORAL EVERY 4 HOURS PRN
Qty: 8 TABLET | Refills: 0 | Status: SHIPPED | OUTPATIENT
Start: 2025-07-15 | End: 2025-07-25

## 2025-07-16 ENCOUNTER — TELEPHONE (OUTPATIENT)
Dept: OBGYN CLINIC | Facility: CLINIC | Age: 35
End: 2025-07-16

## 2025-07-16 NOTE — TELEPHONE ENCOUNTER
Patient was called and discussed the following,    How are you and baby doing in postpartum? Good. Swelling of feet and and ankle since in hospital the same not worse. Denies headache, visual changes, no other symptoms. Patient knows to call triage line if swelling gets worse or develops any symptoms. Patient advised to elevate legs when sitting.    How did the delivery go? Not great, water broke rushed to hospital. Pushing 4 hours then .    Do you still have bleeding/pain? If so, how much/how severe? More bleeding with breast feeding but fine. Incisional pain when standing from bed or chair. Pain manageable with IBU and Tylenol. Sometimes oxy if needed.    Regular BMs/Urination? Yes    Have you made your postpartum appointment yet? Friday-    How is feeding going? Are you breastfeeding bottle or both? Breastfeeding OK called baby and me for lactation appointment. Still only colostrum.     Has baby seen the pediatrician? Darwin Yes yesterday.everything fine.    How are you doing emotionally? Good  Reviewed baby blues and support services if needed.     Do you have any other questions or concerns for us or your provider? No      Patient denies any questions or concerns and reminded to call the triage line for any symptoms or concerns. Patient verbalized understanding.        CONSTANTINO Neil  OB Nurse Navigator

## 2025-07-17 LAB — PLACENTA IN STORAGE: NORMAL

## 2025-07-18 ENCOUNTER — POSTPARTUM VISIT (OUTPATIENT)
Dept: OBGYN CLINIC | Facility: CLINIC | Age: 35
End: 2025-07-18

## 2025-07-18 VITALS
SYSTOLIC BLOOD PRESSURE: 116 MMHG | DIASTOLIC BLOOD PRESSURE: 78 MMHG | HEIGHT: 66 IN | WEIGHT: 167 LBS | BODY MASS INDEX: 26.84 KG/M2

## 2025-07-18 DIAGNOSIS — R60.0 BILATERAL LEG EDEMA: Primary | ICD-10-CM

## 2025-07-18 DIAGNOSIS — N61.0 MASTITIS: ICD-10-CM

## 2025-07-18 PROCEDURE — 99024 POSTOP FOLLOW-UP VISIT: CPT | Performed by: OBSTETRICS & GYNECOLOGY

## 2025-07-18 RX ORDER — CEFUROXIME AXETIL 500 MG/1
500 TABLET ORAL EVERY 12 HOURS SCHEDULED
Qty: 20 TABLET | Refills: 0 | Status: SHIPPED | OUTPATIENT
Start: 2025-07-18 | End: 2025-07-28

## 2025-07-18 RX ORDER — FUROSEMIDE 20 MG/1
20 TABLET ORAL DAILY
Qty: 5 TABLET | Refills: 0 | Status: SHIPPED | OUTPATIENT
Start: 2025-07-18 | End: 2025-07-23

## 2025-07-18 NOTE — PROGRESS NOTES
Name: Camilla Cuevas      : 1990      MRN: [id number]  Encounter Provider: Tatyana Ram DO  Encounter Date: 2025   Encounter department: [facility]  :  Assessment & Plan  Bilateral leg edema    Orders:    furosemide (LASIX) 20 mg tablet; Take 1 tablet (20 mg total) by mouth daily for 5 days    Mastitis    Orders:    cefuroxime (CEFTIN) 500 mg tablet; Take 1 tablet (500 mg total) by mouth every 12 (twelve) hours for 10 days      Doing well postoperatively.      Plan    1. Continue current medications.  2. Wound care discussed.  3. Increase activity as tolerated.   4. Anticipated return to work: 8 - 12 weeks post partum.  5. Follow up at 4-6 weeks post delivery for postpartum checkup.  6. Breastfeeding support through [facility] as needed - has appt next week.         Subjective    Camilla Cuevas is a 35 y.o. y.o. female who presents for an incision check.       Delivery Method: , Low Transverse   Delivery Date and Time: 2025 8:17 PM  Delivery Attending: Tatyana Ram  Gestational Age at delivery: 38w4d   Route of Delivery: , Low Transverse    She is eating a regular diet without difficulty.   Bowel movements are normal.   Pain is controlled with current analgesics. Medications being used: acetaminophen and ibuprofen (OTC).   Bleeding: less flow than a normal period    Breast feeding: yes. Patient is Breastfeeding.     The following portions of the patient's history were reviewed and updated as appropriate: allergies, current medications, past family history, past medical history, past social history, past surgical history, and problem list.    Allergies  Patient has no known allergies.    Medications  Current Medications[1]      Review of Systems  Denies Fevers/chills/N/V/constipation/ excessive vaginal bleeding/excessive pain/dysuria/frequency of urination. Also as noted in HPI.      Objective     /78 (BP Location: Right  "arm, Patient Position: Sitting, Cuff Size: Standard)   Ht 5' 6\" (1.676 m)   Wt 75.8 kg (167 lb)   LMP 10/14/2024   Breastfeeding Yes   BMI 26.95 kg/m²     General: alert and oriented, in no acute distress  Abdomen: soft, bowel sounds active, non-tender  Incision: healing well, no drainage, no erythema, no hernia, no seroma, no swelling, no dehiscence, incision well approximated, skin glue removed.   Breasts: firm, lumpy, right breast outer area with tenderness/warmth - concern for evolving mastitis.               [1]   Current Outpatient Medications:     enoxaparin (LOVENOX) 40 mg/0.4 mL, Inject 0.4 mL (40 mg total) under the skin in the morning, Disp: 36 mL, Rfl: 1    ibuprofen (MOTRIN) 600 mg tablet, Take 1 tablet (600 mg total) by mouth every 6 (six) hours, Disp: , Rfl:     oxyCODONE (Roxicodone) 5 immediate release tablet, Take 1 tablet (5 mg total) by mouth every 4 (four) hours as needed for moderate pain for up to 10 days Max Daily Amount: 30 mg, Disp: 8 tablet, Rfl: 0    Prenatal MV-Min-Fe Fum-FA-DHA (PRENATAL 1 PO), Take by mouth, Disp: , Rfl:     benzocaine-menthol-lanolin-aloe (DERMOPLAST) 20-0.5 % topical spray, Apply 1 Application topically every 6 (six) hours as needed for irritation (Patient not taking: Reported on 7/18/2025), Disp: , Rfl:     calcium carbonate (TUMS) 500 mg chewable tablet, Chew 2 tablets (1,000 mg total) daily as needed for indigestion or heartburn (Patient not taking: Reported on 7/18/2025), Disp: , Rfl:     cholecalciferol (VITAMIN D3) 400 units tablet, Take 400 Units by mouth in the morning. (Patient not taking: Reported on 7/18/2025), Disp: , Rfl:     diphenhydrAMINE (BENADRYL) 25 mg tablet, Take 1 tablet (25 mg total) by mouth every 6 (six) hours as needed for itching (Itching) (Patient not taking: Reported on 7/18/2025), Disp: , Rfl:     docusate sodium (COLACE) 100 mg capsule, Take 100 mg by mouth if needed for constipation (Patient not taking: Reported on 7/18/2025), " Disp: , Rfl:     Doxylamine Succinate, Sleep, (UNISOM PO), Take by mouth (Patient not taking: Reported on 7/18/2025), Disp: , Rfl:     hydrocortisone 1 % cream, Apply 1 Application topically daily as needed for irritation (Patient not taking: Reported on 7/18/2025), Disp: 1.5 g, Rfl: 0    psyllium (METAMUCIL) 58.6 % packet, Take 1 packet by mouth in the morning. (Patient not taking: Reported on 7/18/2025), Disp: , Rfl:     simethicone (MYLICON) 80 mg chewable tablet, Chew 1 tablet (80 mg total) 4 (four) times a day as needed for flatulence (Patient not taking: Reported on 7/18/2025), Disp: , Rfl:     witch hazel-glycerin (TUCKS) topical pad, Apply 1 Pad topically every 4 (four) hours as needed for irritation (Patient not taking: Reported on 7/18/2025), Disp: , Rfl:

## 2025-07-23 NOTE — ANESTHESIA POSTPROCEDURE EVALUATION
Post-Op Assessment Note    CV Status:  Stable    Pain management: adequate       Mental Status:  Alert and awake   Hydration Status:  Euvolemic   PONV Controlled:  Controlled   Airway Patency:  Patent     Post Op Vitals Reviewed: Yes    No anethesia notable event occurred.    Staff: Anesthesiologist           Last Filed PACU Vitals:  Vitals Value Taken Time   Temp 99 °F (37.2 °C) 07/11/25 22:15   Pulse 74 07/11/25 22:30   /59 07/11/25 22:15   Resp 16 07/11/25 22:15   SpO2 96 % 07/11/25 22:30       Modified Jennifer:     Vitals Value Taken Time   Activity 2 07/11/25 22:15   Respiration 2 07/11/25 22:15   Circulation 2 07/11/25 22:15   Consciousness 2 07/11/25 22:15   Oxygen Saturation 2 07/11/25 22:15     Modified Jennifer Score: 10

## 2025-07-24 ENCOUNTER — OFFICE VISIT (OUTPATIENT)
Dept: POSTPARTUM | Facility: CLINIC | Age: 35
End: 2025-07-24
Payer: COMMERCIAL

## 2025-07-24 PROCEDURE — 99404 PREV MED CNSL INDIV APPRX 60: CPT | Performed by: PEDIATRICS

## 2025-07-24 NOTE — PROGRESS NOTES
"INITIAL BREAST FEEDING EVALUATION    Informant/Relationship: Camilla (mom/self) and Matt (FOB)    Discussion of General Lactation Issues: Baby Darwin is about 2 weeks old, Mom did have mastitis last week which she was able to clear. He is now not gaining weight as expected - at two week check in he had not gained from the the week prior. Mom is reporting nipple pain and breast does not seem to be emptying.     Infant, Darwin is 13 days old today.        History:  Fertility Problem:yes - conceived naturally, multiple losses prior    Breast changes:yes - enlargement, tenderness   : water broke at home, \"rough delivery\", she tried for a vaginal delivery. She pushed for 4 hours, failure to descent, baby was born via C/S  Full term:yes - 38 and 4    labor:no  First nursing/attempt < 1 hour after birth:first latch in PACU, a little over an hour. This wasn't a great latch.   Skin to skin following delivery:yes - in PACU  Breast changes after delivery:yes - about 1 week   Rooming in (infant in room with mother with exception of procedures, eg. Circumcision: went to [facility] the first night for a few hours, he had some formula   Blood sugar issues:no  NICU stay:no  Jaundice:no  Phototherapy:no  Supplement given: (list supplement and method used as well as reason(s):yes - concerns he wasn't getting     Past Medical History[1]    Current Medications[2]    No Known Allergies    Social History     Substance and Sexual Activity   Drug Use Never       Social History     Interval Breastfeeding History:    Frequency of breast feeding: nursing 2-3 per day   Does mother feel breastfeeding is effective: No - not regular swallows and only staying latched for 5-10 minutes at a time recently   Does infant appear satisfied after nursing:No  Stooling pattern normal: Yes  Urinating frequently:Yes  Using shield or shells: No    Alternative/Artificial Feedings:   Bottle: Yes, [place], paced the feedings   Cup: " No  Syringe/Finger: No           Formula Type: no                     Amount: n/a            Breast Milk:                      Amount: 2 oz             Frequency Q 2-3 Hr between feedings  Elimination Problems: No      Equipment:  Nipple Shield             Type: no             Size: n/a             Frequency of Use: n/a  Pump            Type: Benedict Go (main pump) and Spectra S1             Frequency of Use: pumping every 2-3 h    Collect 3-4 oz per bottle.       Equipment Problems: no    Mom:  Breast: Engorgement/Swelling on the left breast, softer   Nipple Assessment in General: Normal: elongated/eraser, no discoloration and no damage noted.  Mother's Awareness of Feeding Cues                 Recognizes: Yes                  Verbalizes: Yes  Support System: good support, FOB   History of Breastfeeding: first time breastfeeding   Changes/Stressors/Violence: Darwin isn't gaining as expected, doesn't seem to be transferring milk well.   Concerns/Goals: Camilla would like to do more direct breastfeeding, she would like to continue with some bottles. She is open to EP if needed.     Problems with Mom: engorgement, nipple pain     Physical Exam  Constitutional:       Appearance: Normal appearance.   HENT:      Head: Normocephalic.   Pulmonary:      Effort: Pulmonary effort is normal.     Musculoskeletal:         General: Normal range of motion.      Cervical back: Normal range of motion.     Neurological:      General: No focal deficit present.      Mental Status: She is alert and oriented to person, place, and time.     Skin:     General: Skin is warm.      Capillary Refill: Capillary refill takes less than 2 seconds.     Psychiatric:         Mood and Affect: Mood normal.         Behavior: Behavior normal.         Thought Content: Thought content normal.         Judgment: Judgment normal.       Infant:  Behaviors: Alert  Color: Pink  Birth weight: 3610 g   Current weight: 3260 g     Problems with infant: tension        General Appearance:  Alert, active, no distress                             Head:  Normocephalic, AFOF, sutures opposed                             Eyes:  Conjunctiva clear, no drainage                              Ears:  Normally placed, no anomolies                             Nose:  Septum intact, no drainage or erythema                           Mouth:  No lesions. Tongue is flat when crying, jaw asymmetry noted. Tongue extends just to lip, poor lateralization of the tongue body and tip. Full cup on gloved finger, strong suction, loses contact easily when gentle pressure placed on his lip to observe cupping. Manual lift shows tongue reaching about 75% to palate, slight v shape and frenulum connects along lower alveolar ridge and less than 1 cm from the tongue tip.                     Neck:  Supple, symmetrical, trachea midline                 Respiratory:  No grunting, flaring, retractions, breath sounds clear and equal            Cardiovascular:  Regular rate and rhythm. No murmur. Adequate perfusion/capillary refill. Femoral pulse present                    Abdomen:   Soft, non-tender, no masses, bowel sounds present, no HSM             Genitourinary:  Normal male, testes descended, no discharge, swelling, or pain, anus patent                          Spine:   No abnormalities noted        Musculoskeletal:  Full range of motion          Skin/Hair/Nails:   Skin warm, dry, and intact, no rashes or abnormal dyspigmentation or lesions                Neurologic:   No abnormal movement, tone appropriate for gestational age    [place] Latch:  Efficiency:               Lips Flanged: Yes              Depth of latch: wide               Audible Swallow: Yes, intermittent, improved after RPS              Visible Milk: Yes              Wide Open/ Asymmetrical: Yes              Suck Swallow Cycle: Breathing: yes, Coordinated: yes  Nipple Assessment after latch: slight lipstick shape   Latch Problems: Mom is engorged  on the right breast, softened areola with RPS. Baby attached well, Mom reports latch on pain that resolves within a few seconds but returns if he needs to relatch again. On the left breast he is much more comfortable. Active drinking noted. He nurses on 3 breasts until content and transfers 80 g.     Position:  Infant's Ergonomics/Body               Body Alignment: Yes               Head Supported: Yes               Close to Mom's body/ Lifted/ Supported: Yes               Mom's Ergonomics/Body: Yes                           Supported: Yes                           Sitting Back: Yes                           Brings Baby to her breast: Yes  Positioning Problems: Reviewed BN hold and mom returns this demonstration well on 2 breasts     Education:  Reviewed Latch: importance of deep latch without pain.   Reviewed Positioning for Dyad: proper alignment and head angle when positioning at the breast   Reviewed Frequency/Supply & Demand: offer the breast at each feeding, pump if baby is not latching and effective transferring milk.   Reviewed Infant:Cues and varied States of Awareness: watch for hunger cues, feed on demand. If baby seems satisfied at the breast (calm, relaxed sleeping, breasts are softer) no need to pump or supplement   Reviewed Infant Elimination: goal of 6+ wets and 2-3 stools per day   Reviewed Alternative/Artificial Feedings: paced bottle feeding technique demonstrated  Reviewed Mom/Breast care: gentle handling of the breast at all times, discussed lymphatic drainage and reverse pressure softening, as well as tips for healing sore nipples.    Reviewed Equipment: Hand pump and electric pump general guidance, Discussed proper flange fit, how to measure        Plan:      Reassurance provided. Cont with positioning adjustments and watch for signs of effective feeding. Pump if wanting to replace feeding at the breast with bottle feeding or if latching becomes painful. Cold compresses and ibuprofen  recommended for engorgement relief. Nipple cream of choice covered with wax or parchment paper recommended to promote nipple healing. Gentle handling of the breast at all times to preserve integrity.  [facility] for breastfeeding support as needed or ongoing concerns with latching comfort and milk transfer. Follow up in about two weeks for ongoing feeding support.     I have spent 90 minutes with Patient and family today in which greater than 50% of this time was spent in counseling/coordination of care regarding Patient and family education.                [1]   Past Medical History:  Diagnosis Date    Abnormal Pap smear of cervix     Antiphospholipid syndrome (HCC)     HPV (human papilloma virus) infection     Migraine     Miscarriage     Ovarian cyst     Polycystic ovary syndrome     Possibly- waiting on confirmation from a specializt   [2]   Current Outpatient Medications:     benzocaine-menthol-lanolin-aloe (DERMOPLAST) 20-0.5 % topical spray, Apply 1 Application topically every 6 (six) hours as needed for irritation (Patient not taking: Reported on 7/18/2025), Disp: , Rfl:     calcium carbonate (TUMS) 500 mg chewable tablet, Chew 2 tablets (1,000 mg total) daily as needed for indigestion or heartburn (Patient not taking: Reported on 7/18/2025), Disp: , Rfl:     cefuroxime (CEFTIN) 500 mg tablet, Take 1 tablet (500 mg total) by mouth every 12 (twelve) hours for 10 days, Disp: 20 tablet, Rfl: 0    cholecalciferol (VITAMIN D3) 400 units tablet, Take 400 Units by mouth in the morning. (Patient not taking: Reported on 7/18/2025), Disp: , Rfl:     diphenhydrAMINE (BENADRYL) 25 mg tablet, Take 1 tablet (25 mg total) by mouth every 6 (six) hours as needed for itching (Itching) (Patient not taking: Reported on 7/18/2025), Disp: , Rfl:     docusate sodium (COLACE) 100 mg capsule, Take 100 mg by mouth if needed for constipation (Patient not taking: Reported on 7/18/2025), Disp: , Rfl:     Doxylamine  Succinate, Sleep, (UNISOM PO), Take by mouth (Patient not taking: Reported on 7/18/2025), Disp: , Rfl:     enoxaparin (LOVENOX) 40 mg/0.4 mL, Inject 0.4 mL (40 mg total) under the skin in the morning, Disp: 36 mL, Rfl: 1    furosemide (LASIX) 20 mg tablet, Take 1 tablet (20 mg total) by mouth daily for 5 days, Disp: 5 tablet, Rfl: 0    hydrocortisone 1 % cream, Apply 1 Application topically daily as needed for irritation (Patient not taking: Reported on 7/18/2025), Disp: 1.5 g, Rfl: 0    ibuprofen (MOTRIN) 600 mg tablet, Take 1 tablet (600 mg total) by mouth every 6 (six) hours, Disp: , Rfl:     oxyCODONE (Roxicodone) 5 immediate release tablet, Take 1 tablet (5 mg total) by mouth every 4 (four) hours as needed for moderate pain for up to 10 days Max Daily Amount: 30 mg, Disp: 8 tablet, Rfl: 0    Prenatal MV-Min-Fe Fum-FA-DHA (PRENATAL 1 PO), Take by mouth, Disp: , Rfl:     psyllium (METAMUCIL) 58.6 % packet, Take 1 packet by mouth in the morning. (Patient not taking: Reported on 7/18/2025), Disp: , Rfl:     simethicone (MYLICON) 80 mg chewable tablet, Chew 1 tablet (80 mg total) 4 (four) times a day as needed for flatulence (Patient not taking: Reported on 7/18/2025), Disp: , Rfl:     witch hazel-glycerin (TUCKS) topical pad, Apply 1 Pad topically every 4 (four) hours as needed for irritation (Patient not taking: Reported on 7/18/2025), Disp: , Rfl:

## 2025-07-24 NOTE — PATIENT INSTRUCTIONS
"-Continue to nurse Matthew on demand, or offer the bottle and pump for some feedings if desired.   -Watch for signs of a deep attachment, always should be comfortable, and he should have regular swallows with breasts feeling less full after he is finished.   -Your nipples measured at 15 mm on the right and 17 mm on the left. I recommend trying these size flanges for pumping. Goal with \"correct\" flange fit is that there is no pain with pumping, there is little to no areola tunneling into the flange, and milk is spraying from the nipple when pumping.    --Cycle pumping : high speed, low suction until you see your milk flow, then switch to a lower speed and higher suction to express the milk efficiently. Continue to gradually increase suction and decrease speed throughout the session. You may cycle back into \"massage mode\" to stimulate another letdown when you see your milk flow slow within the pumping session or at the end of the pumping session.   -Try to be as relaxed as possible while pumping, take some deep breaths, feeling yourself relax from head to toe, watch something funny on your phone or TV, cover flanges, etc.    -Wear a supportive, but not tight, bra. Sit comfortably reclined when nursing or pumping, and throughout the day when possible.    --Cold compresses, like a bag of peas applied over a thin blanket or towel to the breast, is recommended for breast comfort and decreasing inflammation in the breast. You can also take ibuprofen as needed.  -For nipple healing I recommended applying nipple cream of choice and cover with a square of wax or parchment paper in between feedings.  -Follow up in two weeks for ongoing support and weight check.   "

## 2025-07-26 NOTE — PROGRESS NOTES
I have reviewed the notes, assessments, and/or procedures performed by Salud Pratt RN, IBCLC, I concur with her/his documentation of Camilla Colindres MD 07/26/25

## 2025-08-19 ENCOUNTER — POSTPARTUM VISIT (OUTPATIENT)
Dept: OBGYN CLINIC | Facility: CLINIC | Age: 35
End: 2025-08-19

## 2025-08-19 VITALS
BODY MASS INDEX: 24.75 KG/M2 | HEIGHT: 66 IN | DIASTOLIC BLOOD PRESSURE: 64 MMHG | SYSTOLIC BLOOD PRESSURE: 104 MMHG | WEIGHT: 154 LBS

## 2025-08-19 PROBLEM — O40.9XX0 POLYHYDRAMNIOS: Status: RESOLVED | Noted: 2025-07-10 | Resolved: 2025-08-19

## 2025-08-19 PROCEDURE — 99024 POSTOP FOLLOW-UP VISIT: CPT | Performed by: OBSTETRICS & GYNECOLOGY

## 2025-08-20 PROBLEM — Z98.891 S/P PRIMARY LOW TRANSVERSE C-SECTION: Status: RESOLVED | Noted: 2025-01-24 | Resolved: 2025-08-20

## 2025-08-20 PROBLEM — O99.119 ANTIPHOSPHOLIPID SYNDROME DURING PREGNANCY (HCC): Status: RESOLVED | Noted: 2025-01-24 | Resolved: 2025-08-20

## 2025-08-20 PROBLEM — D68.61 ANTIPHOSPHOLIPID SYNDROME DURING PREGNANCY (HCC): Status: RESOLVED | Noted: 2025-01-24 | Resolved: 2025-08-20

## (undated) DEVICE — SUT STRATAFIX SPIRAL PDS PLUS 1 CTX 18 IN SXPP1A400

## (undated) DEVICE — Device

## (undated) DEVICE — PACK C-SECTION PBDS

## (undated) DEVICE — SUT STRATAFIX SPIRAL 4-0 PGA/PCL 30 X 30 CM SXMD2B409